# Patient Record
Sex: FEMALE | Race: BLACK OR AFRICAN AMERICAN | Employment: UNEMPLOYED | URBAN - METROPOLITAN AREA
[De-identification: names, ages, dates, MRNs, and addresses within clinical notes are randomized per-mention and may not be internally consistent; named-entity substitution may affect disease eponyms.]

---

## 2018-08-24 ENCOUNTER — HOSPITAL ENCOUNTER (EMERGENCY)
Facility: HOSPITAL | Age: 28
Discharge: HOME | End: 2018-08-24
Attending: EMERGENCY MEDICINE
Payer: MEDICAID

## 2018-08-24 VITALS
DIASTOLIC BLOOD PRESSURE: 59 MMHG | OXYGEN SATURATION: 100 % | RESPIRATION RATE: 16 BRPM | TEMPERATURE: 100 F | SYSTOLIC BLOOD PRESSURE: 115 MMHG | WEIGHT: 145 LBS | HEART RATE: 86 BPM

## 2018-08-24 DIAGNOSIS — S69.91XA INJURY OF FINGER OF RIGHT HAND, INITIAL ENCOUNTER: Primary | ICD-10-CM

## 2018-08-24 DIAGNOSIS — T14.8XXA WOUND INFECTION: ICD-10-CM

## 2018-08-24 DIAGNOSIS — L08.9 WOUND INFECTION: ICD-10-CM

## 2018-08-24 PROCEDURE — 99283 EMERGENCY DEPT VISIT LOW MDM: CPT

## 2018-08-24 PROCEDURE — 29130 APPL FINGER SPLINT STATIC: CPT

## 2018-08-24 RX ORDER — CEPHALEXIN 500 MG/1
500 CAPSULE ORAL 4 TIMES DAILY
Qty: 40 CAPSULE | Refills: 0 | Status: SHIPPED | OUTPATIENT
Start: 2018-08-24 | End: 2018-09-03

## 2018-08-24 NOTE — CM/SW NOTE
Patient has out of [employer] and needs to follow up with hand specialist. Patient has [employer]. This writer went on [facility] and found two hand specialists for patient to follow up with. Patient also needs to get a PCP.  She c

## 2018-08-24 NOTE — ED INITIAL ASSESSMENT (HPI)
Pt here with report of getting sutures in right 4th digit at another facility 6 days ago and ws told to get them out today. Pt states its painful, had drainage yesterday and is \"limp\" and she can't move it.

## 2018-08-24 NOTE — ED PROVIDER NOTES
Patient Seen in: [facility] Emergency Department    History   Patient presents with:  Sut Stap Olga (ingtegumentary)    Stated Complaint: wound check/suture removal    38-year-old female presents today with some history of laceration to the r oriented to person, place, and time. She appears well-developed and well-nourished. No distress. HENT:   Head: Normocephalic. Neck: Normal range of motion.    Pulmonary/Chest: Effort normal.   Musculoskeletal:   Patient with laceration sutures to the pa daily.  Qty: 40 capsule Refills: 0

## 2019-01-16 ENCOUNTER — HOSPITAL ENCOUNTER (EMERGENCY)
Facility: HOSPITAL | Age: 29
Discharge: HOME | End: 2019-01-16
Attending: EMERGENCY MEDICINE
Payer: MEDICAID

## 2019-01-16 ENCOUNTER — HOSPITAL ENCOUNTER (EMERGENCY)
Facility: HOSPITAL | Age: 29
Discharge: LEFT BEFORE DISCHARGE | End: 2019-01-16
Payer: MEDICAID

## 2019-01-16 VITALS
HEART RATE: 86 BPM | DIASTOLIC BLOOD PRESSURE: 56 MMHG | RESPIRATION RATE: 18 BRPM | SYSTOLIC BLOOD PRESSURE: 112 MMHG | WEIGHT: 140 LBS | HEIGHT: 66 IN | OXYGEN SATURATION: 98 % | TEMPERATURE: 99 F | BODY MASS INDEX: 22.5 KG/M2

## 2019-01-16 VITALS
TEMPERATURE: 99 F | BODY MASS INDEX: 27.48 KG/M2 | RESPIRATION RATE: 18 BRPM | HEART RATE: 76 BPM | SYSTOLIC BLOOD PRESSURE: 98 MMHG | HEIGHT: 60 IN | DIASTOLIC BLOOD PRESSURE: 50 MMHG | OXYGEN SATURATION: 100 % | WEIGHT: 140 LBS

## 2019-01-16 DIAGNOSIS — K02.9 PAIN DUE TO DENTAL CARIES: Primary | ICD-10-CM

## 2019-01-16 PROCEDURE — 99283 EMERGENCY DEPT VISIT LOW MDM: CPT

## 2019-01-16 RX ORDER — AMOXICILLIN AND CLAVULANATE POTASSIUM 875; 125 MG/1; MG/1
1 TABLET, FILM COATED ORAL 2 TIMES DAILY
Qty: 20 TABLET | Refills: 0 | Status: SHIPPED | OUTPATIENT
Start: 2019-01-16 | End: 2019-01-26

## 2019-01-16 RX ORDER — AMOXICILLIN 875 MG/1
875 TABLET, COATED ORAL ONCE
Status: COMPLETED | OUTPATIENT
Start: 2019-01-16 | End: 2019-01-16

## 2019-01-16 RX ORDER — HYDROCODONE BITARTRATE AND ACETAMINOPHEN 5; 325 MG/1; MG/1
2 TABLET ORAL ONCE
Status: COMPLETED | OUTPATIENT
Start: 2019-01-16 | End: 2019-01-16

## 2019-01-16 RX ORDER — IBUPROFEN 600 MG/1
600 TABLET ORAL ONCE
Status: COMPLETED | OUTPATIENT
Start: 2019-01-16 | End: 2019-01-16

## 2019-01-16 RX ORDER — HYDROCODONE BITARTRATE AND ACETAMINOPHEN 5; 325 MG/1; MG/1
1-2 TABLET ORAL EVERY 6 HOURS PRN
Qty: 10 TABLET | Refills: 0 | Status: SHIPPED | OUTPATIENT
Start: 2019-01-16 | End: 2019-01-23

## 2019-01-16 NOTE — ED INITIAL ASSESSMENT (HPI)
26 y/o female to ED with c/o of right lower tooth pain. Patient reports she followed up with dentist x1 months ago, reports wisdom teeth need to get removed, has been unable to follow up again.  Pain has worsened with swelling progression to right side of f

## 2019-01-16 NOTE — ED PROVIDER NOTES
Patient Seen in: [facility] Emergency Department    History   Patient presents with:  Dental Problem (dental)    Stated Complaint: Dental pain    HPI    Patient is a 25-year-old been complaining of worsening dental pain for the last several days.   [employer] No focal deficits visualized. ED Course   Labs Reviewed - No data to display       Patient was given amoxicillin, ibuprofen, Norco in the ER.   Recommend follow-up with dentist as soon as possible for definitive management      MDM     Patient is alert,

## 2021-10-15 ENCOUNTER — NURSE ONLY (OUTPATIENT)
Dept: INTERNAL MEDICINE CLINIC | Facility: HOSPITAL | Age: 31
End: 2021-10-15
Attending: EMERGENCY MEDICINE

## 2021-10-15 DIAGNOSIS — Z00.00 WELLNESS EXAMINATION: Primary | ICD-10-CM

## 2021-10-15 PROCEDURE — 86480 TB TEST CELL IMMUN MEASURE: CPT

## 2021-10-19 ENCOUNTER — HOSPITAL ENCOUNTER (OUTPATIENT)
Dept: GENERAL RADIOLOGY | Facility: HOSPITAL | Age: 31
Discharge: HOME | End: 2021-10-19
Attending: PREVENTIVE MEDICINE

## 2021-10-19 ENCOUNTER — EMPLOYEE HEALTH (OUTPATIENT)
Dept: OTHER | Facility: HOSPITAL | Age: 31
End: 2021-10-19
Attending: PREVENTIVE MEDICINE

## 2021-10-19 DIAGNOSIS — Z11.1 SCREENING-PULMONARY TB: Primary | ICD-10-CM

## 2022-10-04 ENCOUNTER — APPOINTMENT (OUTPATIENT)
Dept: OTHER | Facility: HOSPITAL | Age: 32
End: 2022-10-04
Attending: PREVENTIVE MEDICINE

## 2023-06-02 ENCOUNTER — APPOINTMENT (OUTPATIENT)
Dept: ULTRASOUND IMAGING | Facility: HOSPITAL | Age: 33
End: 2023-06-02
Attending: EMERGENCY MEDICINE
Payer: MEDICAID

## 2023-06-02 ENCOUNTER — APPOINTMENT (OUTPATIENT)
Dept: ULTRASOUND IMAGING | Facility: HOSPITAL | Age: 33
End: 2023-06-02
Payer: MEDICAID

## 2023-06-02 ENCOUNTER — HOSPITAL ENCOUNTER (EMERGENCY)
Facility: HOSPITAL | Age: 33
Discharge: HOME | End: 2023-06-02
Attending: EMERGENCY MEDICINE
Payer: MEDICAID

## 2023-06-02 VITALS
BODY MASS INDEX: 28.32 KG/M2 | HEIGHT: 65 IN | SYSTOLIC BLOOD PRESSURE: 117 MMHG | DIASTOLIC BLOOD PRESSURE: 63 MMHG | WEIGHT: 170 LBS | TEMPERATURE: 99 F | HEART RATE: 65 BPM | OXYGEN SATURATION: 100 % | RESPIRATION RATE: 18 BRPM

## 2023-06-02 DIAGNOSIS — O20.0 THREATENED MISCARRIAGE: Primary | ICD-10-CM

## 2023-06-02 LAB
B-HCG SERPL-ACNC: 554 MIU/ML
BASOPHILS # BLD AUTO: 0.05 X10(3) UL (ref 0–0.2)
BASOPHILS NFR BLD AUTO: 0.8 %
EOSINOPHIL # BLD AUTO: 0.14 X10(3) UL (ref 0–0.7)
EOSINOPHIL NFR BLD AUTO: 2.3 %
ERYTHROCYTE [DISTWIDTH] IN BLOOD BY AUTOMATED COUNT: 12.4 %
HCT VFR BLD AUTO: 38.8 %
HGB BLD-MCNC: 13.2 G/DL
IMM GRANULOCYTES # BLD AUTO: 0.02 X10(3) UL (ref 0–1)
IMM GRANULOCYTES NFR BLD: 0.3 %
LYMPHOCYTES # BLD AUTO: 1.56 X10(3) UL (ref 1–4)
LYMPHOCYTES NFR BLD AUTO: 25.4 %
MCH RBC QN AUTO: 28.1 PG (ref 26–34)
MCHC RBC AUTO-ENTMCNC: 34 G/DL (ref 31–37)
MCV RBC AUTO: 82.7 FL
MONOCYTES # BLD AUTO: 0.52 X10(3) UL (ref 0.1–1)
MONOCYTES NFR BLD AUTO: 8.5 %
NEUTROPHILS # BLD AUTO: 3.84 X10 (3) UL (ref 1.5–7.7)
NEUTROPHILS # BLD AUTO: 3.84 X10(3) UL (ref 1.5–7.7)
NEUTROPHILS NFR BLD AUTO: 62.7 %
PLATELET # BLD AUTO: 243 10(3)UL (ref 150–450)
RBC # BLD AUTO: 4.69 X10(6)UL
RH BLOOD TYPE: POSITIVE
WBC # BLD AUTO: 6.1 X10(3) UL (ref 4–11)

## 2023-06-02 PROCEDURE — 86901 BLOOD TYPING SEROLOGIC RH(D): CPT | Performed by: EMERGENCY MEDICINE

## 2023-06-02 PROCEDURE — 86900 BLOOD TYPING SEROLOGIC ABO: CPT | Performed by: EMERGENCY MEDICINE

## 2023-06-02 PROCEDURE — 76817 TRANSVAGINAL US OBSTETRIC: CPT | Performed by: EMERGENCY MEDICINE

## 2023-06-02 PROCEDURE — 85025 COMPLETE CBC W/AUTO DIFF WBC: CPT

## 2023-06-02 PROCEDURE — 84702 CHORIONIC GONADOTROPIN TEST: CPT

## 2023-06-02 PROCEDURE — 85025 COMPLETE CBC W/AUTO DIFF WBC: CPT | Performed by: EMERGENCY MEDICINE

## 2023-06-02 PROCEDURE — 99285 EMERGENCY DEPT VISIT HI MDM: CPT

## 2023-06-02 PROCEDURE — 86901 BLOOD TYPING SEROLOGIC RH(D): CPT

## 2023-06-02 PROCEDURE — 96360 HYDRATION IV INFUSION INIT: CPT

## 2023-06-02 PROCEDURE — 84702 CHORIONIC GONADOTROPIN TEST: CPT | Performed by: EMERGENCY MEDICINE

## 2023-06-02 PROCEDURE — 76801 OB US < 14 WKS SINGLE FETUS: CPT | Performed by: EMERGENCY MEDICINE

## 2023-06-02 PROCEDURE — 86900 BLOOD TYPING SEROLOGIC ABO: CPT

## 2023-06-02 PROCEDURE — 96361 HYDRATE IV INFUSION ADD-ON: CPT

## 2023-06-02 RX ORDER — SODIUM CHLORIDE 9 MG/ML
1000 INJECTION, SOLUTION INTRAVENOUS ONCE
Status: COMPLETED | OUTPATIENT
Start: 2023-06-02 | End: 2023-06-02

## 2023-10-16 ENCOUNTER — APPOINTMENT (OUTPATIENT)
Dept: GENERAL RADIOLOGY | Facility: HOSPITAL | Age: 33
End: 2023-10-16
Attending: EMERGENCY MEDICINE
Payer: MEDICAID

## 2023-10-16 ENCOUNTER — HOSPITAL ENCOUNTER (EMERGENCY)
Facility: HOSPITAL | Age: 33
Discharge: HOME | End: 2023-10-16
Attending: EMERGENCY MEDICINE
Payer: MEDICAID

## 2023-10-16 VITALS
BODY MASS INDEX: 27.8 KG/M2 | OXYGEN SATURATION: 100 % | HEIGHT: 66 IN | RESPIRATION RATE: 16 BRPM | TEMPERATURE: 98 F | SYSTOLIC BLOOD PRESSURE: 110 MMHG | HEART RATE: 56 BPM | DIASTOLIC BLOOD PRESSURE: 61 MMHG | WEIGHT: 173 LBS

## 2023-10-16 DIAGNOSIS — M24.412 RECURRENT SHOULDER DISLOCATION, LEFT: Primary | ICD-10-CM

## 2023-10-16 DIAGNOSIS — H72.91 PERFORATION OF RIGHT TYMPANIC MEMBRANE: ICD-10-CM

## 2023-10-16 PROCEDURE — 73030 X-RAY EXAM OF SHOULDER: CPT | Performed by: EMERGENCY MEDICINE

## 2023-10-16 PROCEDURE — 96374 THER/PROPH/DIAG INJ IV PUSH: CPT

## 2023-10-16 PROCEDURE — 99284 EMERGENCY DEPT VISIT MOD MDM: CPT

## 2023-10-16 RX ORDER — DEXAMETHASONE SODIUM PHOSPHATE 10 MG/ML
10 INJECTION, SOLUTION INTRAMUSCULAR; INTRAVENOUS ONCE
Status: COMPLETED | OUTPATIENT
Start: 2023-10-16 | End: 2023-10-16

## 2023-10-16 NOTE — ED INITIAL ASSESSMENT (HPI)
Patient arrived via EMS, called  for left shoulder injury, \"popped out\" while getting out of bed. Fentanyl given per ems.

## 2023-12-29 ENCOUNTER — HOSPITAL ENCOUNTER (EMERGENCY)
Facility: HOSPITAL | Age: 33
Discharge: LEFT BEFORE DISCHARGE | End: 2023-12-29
Payer: MEDICAID

## 2023-12-29 VITALS
WEIGHT: 168 LBS | HEART RATE: 68 BPM | BODY MASS INDEX: 27 KG/M2 | RESPIRATION RATE: 18 BRPM | OXYGEN SATURATION: 98 % | TEMPERATURE: 99 F | SYSTOLIC BLOOD PRESSURE: 118 MMHG | DIASTOLIC BLOOD PRESSURE: 70 MMHG | HEIGHT: 66 IN

## 2023-12-29 RX ORDER — RISPERIDONE 2 MG/1
5 TABLET, ORALLY DISINTEGRATING ORAL 2 TIMES DAILY
COMMUNITY

## 2023-12-29 NOTE — ED INITIAL ASSESSMENT (HPI)
Patient states rash she knows to be scabies began two days ago bilateral arms and spreading since into bilateral shoulders.  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

## 2024-02-18 ENCOUNTER — APPOINTMENT (OUTPATIENT)
Dept: GENERAL RADIOLOGY | Facility: HOSPITAL | Age: 34
End: 2024-02-18
Attending: EMERGENCY MEDICINE
Payer: MEDICAID

## 2024-02-18 ENCOUNTER — HOSPITAL ENCOUNTER (EMERGENCY)
Facility: HOSPITAL | Age: 34
Discharge: HOME | End: 2024-02-18
Attending: EMERGENCY MEDICINE
Payer: MEDICAID

## 2024-02-18 VITALS
RESPIRATION RATE: 18 BRPM | HEART RATE: 62 BPM | DIASTOLIC BLOOD PRESSURE: 72 MMHG | TEMPERATURE: 98 F | OXYGEN SATURATION: 97 % | SYSTOLIC BLOOD PRESSURE: 96 MMHG | WEIGHT: 168 LBS | BODY MASS INDEX: 27.99 KG/M2 | HEIGHT: 65 IN

## 2024-02-18 DIAGNOSIS — S43.005D DISLOCATION OF LEFT SHOULDER JOINT, SUBSEQUENT ENCOUNTER: Primary | ICD-10-CM

## 2024-02-18 PROCEDURE — 23650 CLTX SHO DSLC W/MNPJ WO ANES: CPT

## 2024-02-18 PROCEDURE — 99285 EMERGENCY DEPT VISIT HI MDM: CPT

## 2024-02-18 PROCEDURE — 99152 MOD SED SAME PHYS/QHP 5/>YRS: CPT

## 2024-02-18 PROCEDURE — 96374 THER/PROPH/DIAG INJ IV PUSH: CPT

## 2024-02-18 PROCEDURE — 96375 TX/PRO/DX INJ NEW DRUG ADDON: CPT

## 2024-02-18 PROCEDURE — 73030 X-RAY EXAM OF SHOULDER: CPT | Performed by: EMERGENCY MEDICINE

## 2024-02-18 RX ORDER — MORPHINE SULFATE 4 MG/ML
4 INJECTION, SOLUTION INTRAMUSCULAR; INTRAVENOUS ONCE
Status: COMPLETED | OUTPATIENT
Start: 2024-02-18 | End: 2024-02-18

## 2024-02-18 RX ORDER — ONDANSETRON 2 MG/ML
4 INJECTION INTRAMUSCULAR; INTRAVENOUS ONCE
Status: COMPLETED | OUTPATIENT
Start: 2024-02-18 | End: 2024-02-18

## 2024-02-19 NOTE — ED INITIAL ASSESSMENT (HPI)
Left shoulder dislocation  at 8:30 pm . Received some fentanyl  from medic. Patient  had  same problem 2 months ago .

## 2024-02-19 NOTE — ED QUICK NOTES
Patient is  crying with pain inj.morphine  4 mg I/v given . Capillary refill normal good radial pulse . Warm to touch  finger tips pink in color

## 2024-02-19 NOTE — ED QUICK NOTES
Patient is verbally abusive to kacie cevallos  and staff.  Patient called   \"[facility]\" Called  public safety .  Security  came and talk to the patient patient is for discharge I/v cannula  removed.

## 2024-02-19 NOTE — ED PROVIDER NOTES
Patient Seen in: [facility] Emergency Department      History     Chief Complaint   Patient presents with    Arm or Hand Injury     Stated Complaint:     Subjective:   HPI    Patient is a 33-year-old female presents emergency room for evaluation of left shoulder dislocation.  She was brought in by EMS.  She complains of left shoulder pain after tapping a closet door tonight.  She has history of dislocation x 2 in the past.  She denies other complaints.    Objective:   History reviewed. No pertinent past medical history.           History reviewed. No pertinent surgical history.             Social History     Socioeconomic History    Marital status: Single   Tobacco Use    Smoking status: Former     Types: Cigarettes    Smokeless tobacco: Never   Vaping Use    Vaping Use: Never used   Substance and Sexual Activity    Alcohol use: Not Currently    Drug use: Never              Review of Systems    Positive for stated complaint:   Other systems are as noted in HPI.  Constitutional and vital signs reviewed.      All other systems reviewed and negative except as noted above.    Physical Exam     ED Triage Vitals [02/18/24 2114]   BP (!) 116/95   Pulse 78   Resp 18   Temp 97.8 °F (36.6 °C)   Temp src Temporal   SpO2 100 %   O2 Device None (Room air)       Current:BP 96/72   Pulse 62   Temp 97.8 °F (36.6 °C) (Temporal)   Resp 18   Ht 165.1 cm (5' 5\")   Wt 76.2 kg   LMP 02/16/2024 (Exact Date)   SpO2 97%   BMI 27.96 kg/m²         Physical Exam    Constitutional: Well-appearing in no acute distress  HEENT: Oropharynx unremarkable  Lungs: Clear  Cardiovascular: Regular rate and rhythm, normal sinus 2  Musculoskeletal: Patient has obvious step-off deformity left shoulder.  Left clavicle and AC joint are nontender.  She has intact sensation left lateral humerus.  Good radial and ulnar pulses to the left upper extremity    ED Course   Labs Reviewed - No data to display          I personally reviewed xray films of  left shoulder and independent interpretation shows good reduction left shoulder with normal alignment and no fracture.  I also reviewed formal xray report as read by radiology with findings below:    XR SHOULDER, COMPLETE (MIN 2 VIEWS), LEFT (CPT=73030)    Result Date: 2/18/2024  PROCEDURE:  XR SHOULDER, COMPLETE (MIN 2 VIEWS), LEFT (CPT=73030)  TECHNIQUE:  Multiple views were obtained.  COMPARISON:  [facility] , XR, XR SHOULDER, COMPLETE (MIN 2 VIEWS), LEFT (CPT=73030), 10/16/2023, 7:16 AM.  INDICATIONS:  shoulder dislocation s/p reduction  PATIENT STATED HISTORY: (As transcribed by Technologist)  post reduction    FINDINGS:  No acute fracture or dislocation.  Joint spaces and bony alignment are maintained.  No focal soft tissue abnormality.            CONCLUSION:  No acute fracture is evident.  Anatomic alignment of the glenohumeral joint.   LOCATION:  [facility]   Dictated by (CST): Kwasi Landin MD on 2/18/2024 at 10:35 PM     Finalized by (CST): Kwasi Landin MD on 2/18/2024 at 10:36 PM         The patient required sedation for left shoulder dislocation.  The risks, benefits, and alternatives were discussed with the patient and/or the family who consented.  The patient had a screening history and exam completed and there are no contraindications to sedation.   ASA designation is ASA 1 - Normal health patient.  Mallampati score: II (hard and soft palate, upper portion of tonsils anduvula visible).  The patient was placed on monitors and was under constant nursing observation.  Under my direct supervision the patient was given propofol.  An appropriate level of sedation was achieved.  The patient remained hemodynamically stable with normal oxygen saturations during the procedure.  There were no complications related to the sedation.  Patient was observed until mental status returned to baseline.  Patient was subsequently deemed appropriate for discharge home with responsible caretaker.  The sedation lasted 16 minutes  during which I was present.      Procedure note: Left shoulder reduction.  Patient was given sedation for left shoulder reduction.  Patient had external rotation and adduction performed of the left shoulder with good reduction.  Step-off was resolved.  X-ray obtained showing good reduction.  She was placed in shoulder immobilizer      MDM      Patient is a 33-year-old female presents emergency room for evaluation of left shoulder pain.  Differential clues fracture, shoulder dislocation.  Patient with clinical evidence for left shoulder dislocation.  Status post sedation and reduction with good reduction left shoulder.  Shoulder immobilizer given.  Patient was extremely rude to ED staff including the emergency room techs, nurses.  She has to speak with me about her discharge instructions.  I came into the room and answered all of her questions.  As I was walking out of the room, she called me and asshole.  I questioned her on why she was calling me and asshole.  She became very agitated, yelling and screaming obscenities.  I explained to her that I will place in her chart a note regarding her behavior towards myself and staff.  She had to be escorted out by security.  She was given orthopedic follow-up                            Medical Decision Making      Disposition and Plan     Clinical Impression:  1. Dislocation of left shoulder joint, subsequent encounter         Disposition:  Discharge  2/18/2024 10:49 pm    Follow-up:  Lombardi, John A, MD  [facility] [address] [address]  [phone]    Follow up  As needed          Medications Prescribed:  Discharge Medication List as of 2/18/2024 11:14 PM

## 2024-02-20 ENCOUNTER — HOSPITAL ENCOUNTER (EMERGENCY)
Facility: HOSPITAL | Age: 34
Discharge: HOME | End: 2024-02-20
Attending: EMERGENCY MEDICINE
Payer: MEDICAID

## 2024-02-20 VITALS
DIASTOLIC BLOOD PRESSURE: 79 MMHG | WEIGHT: 168 LBS | SYSTOLIC BLOOD PRESSURE: 143 MMHG | BODY MASS INDEX: 27.99 KG/M2 | HEART RATE: 61 BPM | TEMPERATURE: 98 F | OXYGEN SATURATION: 99 % | RESPIRATION RATE: 16 BRPM | HEIGHT: 65 IN

## 2024-02-20 DIAGNOSIS — S46.912S SHOULDER STRAIN, LEFT, SEQUELA: Primary | ICD-10-CM

## 2024-02-20 LAB
BILIRUB UR QL STRIP.AUTO: NEGATIVE
CLARITY UR REFRACT.AUTO: CLEAR
GLUCOSE UR STRIP.AUTO-MCNC: NORMAL MG/DL
KETONES UR STRIP.AUTO-MCNC: NEGATIVE MG/DL
LEUKOCYTE ESTERASE UR QL STRIP.AUTO: NEGATIVE
NITRITE UR QL STRIP.AUTO: NEGATIVE
PH UR STRIP.AUTO: 6 [PH] (ref 5–8)
PROT UR STRIP.AUTO-MCNC: NEGATIVE MG/DL
RBC UR QL AUTO: NEGATIVE
SP GR UR STRIP.AUTO: 1.02 (ref 1–1.03)
UROBILINOGEN UR STRIP.AUTO-MCNC: 2 MG/DL

## 2024-02-20 PROCEDURE — 81003 URINALYSIS AUTO W/O SCOPE: CPT

## 2024-02-20 PROCEDURE — 99284 EMERGENCY DEPT VISIT MOD MDM: CPT

## 2024-02-20 PROCEDURE — 81003 URINALYSIS AUTO W/O SCOPE: CPT | Performed by: EMERGENCY MEDICINE

## 2024-02-20 PROCEDURE — 99283 EMERGENCY DEPT VISIT LOW MDM: CPT

## 2024-02-20 PROCEDURE — 96372 THER/PROPH/DIAG INJ SC/IM: CPT

## 2024-02-20 RX ORDER — NAPROXEN 500 MG/1
500 TABLET ORAL 2 TIMES DAILY PRN
Qty: 20 TABLET | Refills: 0 | Status: SHIPPED | OUTPATIENT
Start: 2024-02-20 | End: 2024-02-27

## 2024-02-20 RX ORDER — TIZANIDINE HYDROCHLORIDE 2 MG/1
2 CAPSULE, GELATIN COATED ORAL 3 TIMES DAILY
Qty: 30 CAPSULE | Refills: 0 | Status: SHIPPED | OUTPATIENT
Start: 2024-02-20

## 2024-02-20 RX ORDER — KETOROLAC TROMETHAMINE 30 MG/ML
30 INJECTION, SOLUTION INTRAMUSCULAR; INTRAVENOUS ONCE
Status: COMPLETED | OUTPATIENT
Start: 2024-02-20 | End: 2024-02-20

## 2024-02-21 NOTE — ED INITIAL ASSESSMENT (HPI)
PT SEEN HERE YESTERDAY FOR SHOULDER DISLOCATION, REPAIRED WITH SEDATION. NOW FEELS DEHYDRATED AND STATES URINE IS CONCENTRATED. STILL WITH C/O BACK PAIN.

## 2024-02-23 NOTE — ED PROVIDER NOTES
Patient Seen in: [facility] Emergency Department      History     Chief Complaint   Patient presents with    Back Pain    Dehydration     Stated Complaint: seen here yesterday for shoulder dislocation, feels dehydrated and wants IVF's    Subjective:   HPI    Patient for evaluation of pain in her posterior left shoulder.  She was seen here yesterday by my partner, had a dislocated shoulder was relocated.  She states that she does not do well when she is in intense pain like that and was unable to properly receive discharge instructions.  While she states that she was not sent home with any medications and also feels like she has \"[place]mouth and would like some IV fluids.  Denies any nausea vomiting diarrhea.  No chest pain shortness of breath lightheadedness.  Pain is in her left scapular region, feels like a spasm with positional changes and movement.  Is presently in a shoulder immobilizer.    I did see orthopedic surgery as an outpatient in follow-up earlier today, was told to seek shoulder specialty follow-up with an in network surgeon.      Pain is not exertional.  Not associate with any other pain, shortness of breath lightheadedness or diaphoresis.    Objective:   History reviewed. No pertinent past medical history.           History reviewed. No pertinent surgical history.             Social History     Socioeconomic History    Marital status: Single   Tobacco Use    Smoking status: Former     Types: Cigarettes    Smokeless tobacco: Never   Vaping Use    Vaping Use: Never used   Substance and Sexual Activity    Alcohol use: Not Currently    Drug use: Never              Review of Systems    Positive for stated complaint: seen here yesterday for shoulder dislocation, feels dehydrated and wants IVF's  Other systems are as noted in HPI.  Constitutional and vital signs reviewed.      All other systems reviewed and negative except as noted above.    Physical Exam     ED Triage Vitals [02/20/24 2056]   BP  143/79   Pulse 61   Resp 16   Temp 98.4 °F (36.9 °C)   Temp src Oral   SpO2 99 %   O2 Device        Current:/79   Pulse 61   Temp 98.4 °F (36.9 °C) (Oral)   Resp 16   Ht 165.1 cm (5' 5\")   Wt 76.2 kg   LMP 02/16/2024 (Exact Date)   SpO2 99%   BMI 27.96 kg/m²         Physical Exam    Physical Exam   Constitutional: Awake, alert, well appearing  Head: Normocephalic and atraumatic.   Eyes: Conjunctivae are normal. Pupils are equal, round, and reactive to light.   Neck: Normal range of motion. Neck supple.   Cardiovascular: Normal rate, regular rhythm  Pulmonary/Chest: Normal effort.  No accessory muscle use.  No clubbing, no cyanosis.  Abdominal: Soft. Bowel sounds are normal.   Neurological: Pt is alert and oriented to person, place, and time. no cranial nerve deficits  Skin: Skin is warm and dry.      Left shoulder and shoulder immobilizer.  No deformity.  Sensation intact over the deltoid and as best I can tell she is able to fire the deltoid.  Has obvious reproducible tenderness in her left infrascapular region that is reproduced with certain movements as well.  Specifically rotational and tilting movements of her torso.    ED Course     Labs Reviewed   URINALYSIS, ROUTINE - Abnormal; Notable for the following components:       Result Value    Urobilinogen Urine 2 (*)     All other components within normal limits             Medications   ketorolac (Toradol) 30 MG/ML injection 30 mg (30 mg Intramuscular Given 2/20/24 2147)         Urine specific gravity is fine, no ketones         MDM          Differential diagnoses considered: Fracture, strain, muscle spasm.  -Favoring either a shoulder muscle related strain or potentially a spasm as a result of yesterday's trauma and being in the shoulder immobilizer.  Advil, Tylenol tizanidine.    -Patient is not clinically dehydrated.  Encourage oral rehydration at home      *Prescription sent to the pharmacy as noted below    Of note, the patient already has  follow-up with orthopedic surgeon later this week.          *Discussion of ongoing management of this patient's care included: n/a  *Comorbidities contributing to the complexity of decision making: n/a  *External charts reviewed: [facility] outpatient records reviewed, Dr. Downs's note reviewed.  Asked patient to seek shoulder follow-up given recurrent dislocation.  *Additional sources of history: n/a    Shared decision making was done by: patient, myself.                                     Medical Decision Making      Disposition and Plan     Clinical Impression:  1. Shoulder strain, left, sequela         Disposition:  Discharge  2/20/2024  9:49 pm    Follow-up:  Jimy Graham MD  [address]  [facility] [address]  [phone]    Follow up            Medications Prescribed:  Discharge Medication List as of 2/20/2024  9:55 PM        START taking these medications    Details   naproxen 500 MG Oral Tab Take 1 tablet (500 mg total) by mouth 2 (two) times daily as needed., Normal, Disp-20 tablet, R-0      tiZANidine HCl 2 MG Oral Cap Take 1 capsule (2 mg total) by mouth 3 (three) times daily., Normal, Disp-30 capsule, R-0

## 2024-08-02 ENCOUNTER — APPOINTMENT (OUTPATIENT)
Dept: GENERAL RADIOLOGY | Facility: HOSPITAL | Age: 34
End: 2024-08-02
Attending: EMERGENCY MEDICINE
Payer: MEDICAID

## 2024-08-02 ENCOUNTER — HOSPITAL ENCOUNTER (EMERGENCY)
Facility: HOSPITAL | Age: 34
Discharge: HOME | End: 2024-08-03
Attending: EMERGENCY MEDICINE
Payer: MEDICAID

## 2024-08-02 DIAGNOSIS — S43.005A DISLOCATION OF LEFT SHOULDER JOINT, INITIAL ENCOUNTER: Primary | ICD-10-CM

## 2024-08-02 PROCEDURE — 96374 THER/PROPH/DIAG INJ IV PUSH: CPT

## 2024-08-02 PROCEDURE — 23650 CLTX SHO DSLC W/MNPJ WO ANES: CPT

## 2024-08-02 PROCEDURE — 99285 EMERGENCY DEPT VISIT HI MDM: CPT

## 2024-08-02 PROCEDURE — 73030 X-RAY EXAM OF SHOULDER: CPT | Performed by: EMERGENCY MEDICINE

## 2024-08-02 PROCEDURE — 96375 TX/PRO/DX INJ NEW DRUG ADDON: CPT

## 2024-08-02 PROCEDURE — 73020 X-RAY EXAM OF SHOULDER: CPT | Performed by: EMERGENCY MEDICINE

## 2024-08-02 RX ORDER — MORPHINE SULFATE 4 MG/ML
4 INJECTION, SOLUTION INTRAMUSCULAR; INTRAVENOUS ONCE
Status: COMPLETED | OUTPATIENT
Start: 2024-08-02 | End: 2024-08-02

## 2024-08-03 VITALS
HEIGHT: 65 IN | DIASTOLIC BLOOD PRESSURE: 75 MMHG | BODY MASS INDEX: 27.16 KG/M2 | OXYGEN SATURATION: 100 % | RESPIRATION RATE: 16 BRPM | TEMPERATURE: 99 F | WEIGHT: 163 LBS | HEART RATE: 66 BPM | SYSTOLIC BLOOD PRESSURE: 116 MMHG

## 2024-08-03 NOTE — ED PROVIDER NOTES
Patient Seen in: [facility] Emergency Department      History     Chief Complaint   Patient presents with    Arm or Hand Injury     Stated Complaint: left shoulder dislocation    Subjective:   HPI    34-year-old female patient presented with a chief complaint of shoulder pain. She has a history of similar issues and is scheduled for surgery soon.  Patient states she does want to try to put on her bra and her left shoulder dislocated 30 minutes prior to arrival.  She has a history of multiple shoulder dislocations.  She called the ambulance and was given 75 mcg of fentanyl and route.  She denies any numbness but rates the pain a 10 out of 10 and she is tearful.  She states she is having trouble getting orthopedics to do surgery but it does sound like it is scheduled.    Objective:   No pertinent past medical history.            No pertinent past surgical history.              No pertinent social history.            Review of Systems    Positive for stated Chief Complaint: Arm or Hand Injury    Other systems are as noted in HPI.  Constitutional and vital signs reviewed.      All other systems reviewed and negative except as noted above.    Physical Exam     ED Triage Vitals [08/02/24 2147]   /76   Pulse 86   Resp 24   Temp 98.6 °F (37 °C)   Temp src Oral   SpO2 100 %   O2 Device None (Room air)       Current Vitals:   Vital Signs  BP: 104/67  Pulse: 63  Resp: 16  Temp: 98.6 °F (37 °C)  Temp src: Oral  MAP (mmHg): 79    Oxygen Therapy  SpO2: 98 %  O2 Device: None (Room air)  ETCO2 (mmHg): 34 mmHg            Physical Exam      Vital signs reviewed  General appearance: Patient is alert and in moderate pain distress  HEENT: Pupils equal react to light extraocular muscles intact no scleral icterus, mucous membranes are moist, there is no erythema or exudate in the posterior pharynx  Neck: Supple no JVD no lymphadenopathy no meningismus no carotid bruit  CV: Regular rate and rhythm no murmur rub  Respiratory:  Clear to auscultation bilaterally no crackles no wheezes no accessory muscle use  Abdomen: Soft nontender nondistended, no rebound no guarding  no hepatosplenomegaly bowel sounds are present , no pulsatile mass  Extremities: No clubbing cyanosis or edema 2+ distal pulses.  Left shoulder does have a step-off deformity.  Does appear dislocated  Neuro: Cranial nerves II through XII intact with no gross focal sensory or motor abnormality.      ED Course   Labs Reviewed - No data to display          Patient was evaluated in the emergency department and does appear to have a dislocated shoulder.  She is frustrated having to wait so long to get this fixed from orthopedics.  She is very pleasant however here.  I did offer her a nonsedative technique to do the shoulder relocation but she states she is in so much pain she really would just prefer to being put to sleep to get it done.  She states she has not eaten anything since 1 PM.  I am in agreement and happy to do that for her.    The patient required sedation for shoulder relocation.  The risks, benefits, and alternatives were discussed with the patient and/or the family who consented.  The patient had a screening history and exam completed and there are no contraindications to sedation.  The patient has been NPO for 8 hours. ASA designation is ASA 1 - Normal health patient.  Mallampati score: II (hard and soft palate, upper portion of tonsils anduvula visible).  The patient was placed on monitors and was under constant nursing observation.  Under my direct supervision the patient was given 120 milligrams of propofol.  An appropriate level of sedation was achieved.  The patient remained hemodynamically stable with normal oxygen saturations during the procedure.  There were no complications related to the sedation.  Patient was observed until mental status returned to baseline.  Patient was subsequently deemed appropriate for discharge home with responsible caretaker.   The sedation lasted 5 minutes during which I was present.    XR SHOULDER, (1 VIEW), LEFT (CPT=73020)    Result Date: 8/2/2024  CONCLUSION:  Successful reduction of previously noted left shoulder dislocation.  No evidence for acute fracture.   LOCATION:  [facility]   Dictated by (CST): Nevaeh Trinidad MD on 8/02/2024 at 11:59 PM     Finalized by (CST): Nevaeh Trinidad MD on 8/02/2024 at 11:59 PM       XR SHOULDER, COMPLETE (MIN 2 VIEWS), LEFT (CPT=73030)    Result Date: 8/2/2024  CONCLUSION:  There is anterior inferior dislocation of the left humeral head in relation to the glenoid.   LOCATION:  [facility]   Dictated by (CST): Nevaeh Trinidad MD on 8/02/2024 at 11:13 PM     Finalized by (CST): Nevaeh Trinidad MD on 8/02/2024 at 11:13 PM          Good reduction of the shoulder was seen on postreduction films.  Patient was wide-awake saturating well will be discharged home she should follow-up with her orthopedist.    Patient was seen immediately upon arrival due to a high probability of sudden, clinically significant, or life threatening deterioration of the patient's clinical status.  The patient required my time at the bedside performing direct personal management, the absence of which would likely result in sudden, clinically significant or life threatening deterioration of  clinical condition.   The patient required my constant attention. Time was spent ordering, reviewing, and interpreting ancillary testing and imaging. The patient was frequently reevaluated, and I made frequent checks of  vital signs and monitor. Nursing notes were reviewed.     Critical care time was[60 minutes], and exclusive of procedures.       MDM      Differential diagnosis reflecting the complexity of care include: Shoulder dislocation      External chart review was done and was noted: Patient's note from a few months ago in the ER was reviewed with a shoulder dislocation was reduced successfully here also.      My independent interpretation of  studies of: Initial x-ray showed an anterior shoulder dislocation post films showed good reduction with no acute fracture      Shared decision making was done by myself and patient.  Patient be discharged home follow-up with orthopedics.  She does have an appointment with 1 in [facility] but did tell her about another group she could try if having trouble getting in to get this fixed.        Patient was screened and evaluated during this visit.  As the treating physician attending to the patient, I determined within reasonable clinical confidence and prior to discharge, that an emergency medical condition was not or was no longer present.  There was no indication for further evaluation, treatment, or admission on an emergency basis.  Comprehensive verbal and written discharge and follow-up instructions were provided to help prevent relapse or worsening.  Patient was instructed to follow-up with primary care provider for further evaluation treatment, return immediately to ER for worsening, concerning, new, or changing/persisting symptoms.  I discussed the case with the patient and they had no questions, complaints, or concerns.  Patient was comfortable going home.      Dictation Disclaimer Note:   To increase efficiency this document may have been prepared using voice recognition technology. Every effort has been made to correct any errors made during preparation of this note. However, if a word or phrase is confusing, or does not make sense, this is likely due to a recognition error within the program which was not discovered during editing. Please do not hesitate to contact to address any significant errors.    Note to Patient:   The 21st Century Cures Act makes medical notes like these available to patients in the interest of transparency. Please be advised this is a medical document. Medical documents are intended to carry relevant information, facts as evident, and the clinical opinion of the practitioner. The  medical note is intended as peer to peer communication and may appear blunt or direct. It is written in medical language and may contain abbreviations or verbiage that are unfamiliar.                               Medical Decision Making      Disposition and Plan     Clinical Impression:  1. Dislocation of left shoulder joint, initial encounter         Disposition:  Discharge  8/3/2024 12:04 am    Follow-up:  your orthopedics    Follow up            Medications Prescribed:  Current Discharge Medication List

## 2024-08-03 NOTE — ED INITIAL ASSESSMENT (HPI)
Pt here by EMS after shoulder dislocated while putting bra on about 30 min PTA, pt has hx of multiple shoulder dislocations. 75 mcg fentanyl given in route.

## 2024-10-23 ENCOUNTER — HOSPITAL ENCOUNTER (EMERGENCY)
Facility: HOSPITAL | Age: 34
Discharge: LEFT BEFORE DISCHARGE | End: 2024-10-24
Payer: MEDICAID

## 2024-10-23 VITALS
SYSTOLIC BLOOD PRESSURE: 110 MMHG | TEMPERATURE: 98 F | BODY MASS INDEX: 27.82 KG/M2 | HEIGHT: 65 IN | RESPIRATION RATE: 16 BRPM | OXYGEN SATURATION: 99 % | HEART RATE: 70 BPM | WEIGHT: 167 LBS | DIASTOLIC BLOOD PRESSURE: 64 MMHG

## 2024-10-24 NOTE — ED INITIAL ASSESSMENT (HPI)
Pt concerned for swelling on left side of face, under left ear.  Feels face \"filled with air\"  denies trouble breathing/swallowing.  No rash reported.

## 2025-01-23 ENCOUNTER — APPOINTMENT (OUTPATIENT)
Dept: GENERAL RADIOLOGY | Facility: HOSPITAL | Age: 35
End: 2025-01-23
Attending: EMERGENCY MEDICINE
Payer: MEDICAID

## 2025-01-23 ENCOUNTER — HOSPITAL ENCOUNTER (EMERGENCY)
Facility: HOSPITAL | Age: 35
Discharge: HOME | End: 2025-01-23
Attending: EMERGENCY MEDICINE
Payer: MEDICAID

## 2025-01-23 VITALS
HEART RATE: 55 BPM | TEMPERATURE: 98 F | DIASTOLIC BLOOD PRESSURE: 53 MMHG | OXYGEN SATURATION: 100 % | BODY MASS INDEX: 24.91 KG/M2 | RESPIRATION RATE: 18 BRPM | HEIGHT: 66 IN | WEIGHT: 155 LBS | SYSTOLIC BLOOD PRESSURE: 104 MMHG

## 2025-01-23 DIAGNOSIS — S43.005A DISLOCATION OF LEFT SHOULDER JOINT, INITIAL ENCOUNTER: Primary | ICD-10-CM

## 2025-01-23 PROCEDURE — 96376 TX/PRO/DX INJ SAME DRUG ADON: CPT

## 2025-01-23 PROCEDURE — 99285 EMERGENCY DEPT VISIT HI MDM: CPT

## 2025-01-23 PROCEDURE — 73030 X-RAY EXAM OF SHOULDER: CPT | Performed by: EMERGENCY MEDICINE

## 2025-01-23 PROCEDURE — 23650 CLTX SHO DSLC W/MNPJ WO ANES: CPT

## 2025-01-23 PROCEDURE — 96361 HYDRATE IV INFUSION ADD-ON: CPT

## 2025-01-23 PROCEDURE — 96375 TX/PRO/DX INJ NEW DRUG ADDON: CPT

## 2025-01-23 PROCEDURE — 96374 THER/PROPH/DIAG INJ IV PUSH: CPT

## 2025-01-23 RX ORDER — ONDANSETRON 2 MG/ML
4 INJECTION INTRAMUSCULAR; INTRAVENOUS ONCE
Status: COMPLETED | OUTPATIENT
Start: 2025-01-23 | End: 2025-01-23

## 2025-01-23 RX ORDER — SODIUM CHLORIDE 9 MG/ML
125 INJECTION, SOLUTION INTRAVENOUS CONTINUOUS
Status: DISCONTINUED | OUTPATIENT
Start: 2025-01-23 | End: 2025-01-23

## 2025-01-23 RX ORDER — HYDROMORPHONE HYDROCHLORIDE 1 MG/ML
1 INJECTION, SOLUTION INTRAMUSCULAR; INTRAVENOUS; SUBCUTANEOUS ONCE
Status: COMPLETED | OUTPATIENT
Start: 2025-01-23 | End: 2025-01-23

## 2025-01-23 NOTE — ED PROVIDER NOTES
Patient Seen in: [facility] Emergency Department      History     Chief Complaint   Patient presents with    Arm or Hand Injury     L shoulder dislocation     Stated Complaint: R shoulder dislocation    Subjective:   HPI      This is a 34-year-old female past medical hist anxiety, bipolar who presents with left shoulder dislocation.  Patient states it happened least 5 times previously.  Has been evaluated orthopedics and she states they told her she did not need surgery.  She dislocated her shoulder today attempting to put her shirt on.  She called 911 presents here via ambulance for further evaluation.    Objective:     Past Medical History:    Anxiety    Bipolar 1 disorder (HCC)              History reviewed. No pertinent surgical history.             Social History     Socioeconomic History    Marital status: Single   Tobacco Use    Smoking status: Every Day     Types: Cigarettes, Cigars     Passive exposure: Current    Smokeless tobacco: Never   Vaping Use    Vaping status: Never Used   Substance and Sexual Activity    Alcohol use: Not Currently    Drug use: Not Currently     Types: Cannabis     Social Drivers of Health     Financial Resource Strain: Not on File (10/7/2022)    Received from [facility]    Financial Resource Strain     Financial Resource Strain: 0   Food Insecurity: Not on File (2024)    Received from [facility]    Food Insecurity     Food: 0   Transportation Needs: Not on File (10/7/2022)    Received from [facility]    Transportation Needs     Transportation: 0   Physical Activity: Not on File (10/7/2022)    Received from [facility]    Physical Activity     Physical Activity: 0   Stress: Not on File (10/7/2022)    Received from [facility]    Stress     Stress: 0   Social Connections: Not on File (2024)    Received from [facility]    Social Connections     Connectedness: 0   Housing Stability: Not on File (10/7/2022)    Received from [facility]    Housing Stability     Housin                   Physical Exam     ED Triage Vitals   BP 01/23/25 0618 115/70   Pulse 01/23/25 0618 87   Resp 01/23/25 0618 13   Temp 01/23/25 0929 97.6 °F (36.4 °C)   Temp src 01/23/25 0929 Temporal   SpO2 01/23/25 0618 99 %   O2 Device 01/23/25 0618 None (Room air)       Current Vitals:   Vital Signs  BP: 104/53  Pulse: 55  Resp: 18  Temp: 97.6 °F (36.4 °C)  Temp src: Temporal  MAP (mmHg): 69    Oxygen Therapy  SpO2: 100 %  O2 Device: None (Room air)  O2 Flow Rate (L/min): 3 L/min        Physical Exam  GENERAL: Awake, alert oriented x3, nontoxic appearing.   SKIN: Normal, warm, and dry.  HEENT:  Pupils equally round and reactive to light. Conjuctiva clear.  Oropharynx is clear and moist.   Lungs: Clear to auscultation bilaterally with no rales, no retractions, and no wheezing.  HEART:  Regular rate and rhythm. S1 and S2. No murmurs, no rubs or gallops.   ABDOMEN: Soft, nontender and nondistended. Normoactive bowel sounds. No rebound. No guarding.   EXTREMITIES: Left anterior shoulder dislocation noted on exam.  +2/4 radial pulse.  Sensation intact over the deltoid.  Warm with brisk capillary refill.         ED Course   Labs Reviewed - No data to display         XR SHOULDER, COMPLETE (MIN 2 VIEWS), LEFT (CPT=73030)    Result Date: 1/23/2025  PROCEDURE:  XR SHOULDER, COMPLETE (MIN 2 VIEWS), LEFT (CPT=73030)  TECHNIQUE:  Multiple views were obtained.  COMPARISON:  [facility] , XR, XR SHOULDER, COMPLETE (MIN 2 VIEWS), LEFT (CPT=73030), 1/23/2025, 6:18 AM.  INDICATIONS:  R shoulder dislocation  PATIENT STATED HISTORY: (As transcribed by Technologist)  Left shoulder dislocation -post reduction Patient was combative during the exam. Best possible images.    FINDINGS:  Close reduction with restoration of anatomic relationship between the glenoid and humeral head.  No fracture demonstrated.              CONCLUSION:  As above.   LOCATION:  [facility]   Dictated by (CST): Elpidio Thomas MD on 1/23/2025 at 7:08 AM     Finalized by  (CST): Elpidio Thomas MD on 1/23/2025 at 7:08 AM       XR SHOULDER, COMPLETE (MIN 2 VIEWS), LEFT (CPT=73030)    Result Date: 1/23/2025  PROCEDURE:  XR SHOULDER, COMPLETE (MIN 2 VIEWS), LEFT (CPT=73030)  TECHNIQUE:  Multiple views were obtained.  COMPARISON:  [facility] , XR, XR SHOULDER, (1 VIEW), LEFT (CPT=73020), 8/02/2024, 11:49 PM.  INDICATIONS:  R shoulder dislocation  PATIENT STATED HISTORY: (As transcribed by Technologist)  Patient stated falling and has left shoulder dislocation.    FINDINGS:  Abnormal relationship between the glenoid and the humeral head consistent with dislocation, anterior-inferior.  No associated fracture.              CONCLUSION:  Dislocation.   LOCATION:  [facility]   Dictated by (CST): Elpidio Thomas MD on 1/23/2025 at 6:34 AM     Finalized by (CST): Elpidio Thomas MD on 1/23/2025 at 6:34 AM             MDM        This is a 34-year-old female past medical hist anxiety, bipolar who presents with left shoulder dislocation.  Patient states it happened least 5 times previously.  Has been evaluated orthopedics and she states they told her she did not need surgery.  She dislocated her shoulder today attempting to put her shirt on.  Differential includes shoulder dislocation/fracture.    Patient placed on cardiac monitor, continuous pulse oximetry and IV line was established of normal saline.  Patient was given IV Dilaudid and Zofran.      Consent was obtained for closed reduction/conscious sedation.  Respiratory bedside.  Patient was initially given Dilaudid for pain with Zofran.  She was given propofol for the reduction.  Using the FARES technique I successfully reduced her left shoulder dislocation.  Circulation, motor and sensory was intact post reduction.  +2/4 radial pulse.  Sensation intact over deltoid.    Shoulder immobilizer was applied postreduction.    I independently reviewed the initial left shoulder x-ray which showed anterior shoulder dislocation.  Also reviewed radiology interpretation  agreement    I independently reviewed the postreduction x-ray which shows shoulder in good anatomic alignment.  Also reviewed the radiology interpretation and agreement.        Patient was recovered to her neurologic baseline.  She can follow-up with orthopedics.  She was very unhappy with the [facility] orthopedic physician who told her she did not need surgery.  She is very adamant that she wants to have surgical correction.  She was referred to the on-call orthopedic surgeon .  She can take ibuprofen/Tylenol for pain.  Cold compresses topically.  Return for any problems.  Patient was discharged home in good condition.        The patient required sedation for closed reduction left anterior shoulder dislocation.  The risks, benefits, and alternatives were discussed with the patient and/or the family who consented.  The patient had a screening history and exam completed and there are no contraindications to sedation.  The patient has been NPO for 4 hours. ASA designation is ASA 1 - Normal health patient.  Mallampati score: II (hard and soft palate, upper portion of tonsils anduvula visible).  The patient was placed on monitors and was under constant nursing observation.  Under my direct supervision the patient was given propofol.  An appropriate level of sedation was achieved.  The patient remained hemodynamically stable with normal oxygen saturations during the procedure.  There were no complications related to the sedation.  Patient was observed until mental status returned to baseline.  Patient was subsequently deemed appropriate for discharge home with responsible caretaker.  The sedation lasted 20 minutes during which I was present.    A total of 35 minutes of critical care time (exclusive of billable procedures) was administered to manage the patient's critical imaging findings due to her left anterior shoulder dislocation.  This involved direct patient intervention, complex decision making, and/or  extensive discussions with the patient, family, and clinical staff.                 Disposition and Plan     Clinical Impression:  1. Dislocation of left shoulder joint, initial encounter         Disposition:  Discharge  1/23/2025  9:38 am    Follow-up:  Hank Velazco MD  [address] [place], [place] [address] [id number] [phone]    Follow up in 1 week(s)            Medications Prescribed:  Current Discharge Medication List              Supplementary Documentation:

## 2025-01-23 NOTE — DISCHARGE INSTRUCTIONS
Wear shoulder immobilizer for support, may take off while bathing  Follow-up with orthopedics in the next 1 to 2 weeks call and make an appointment  Cold compresses topically to shoulder for discomfort 20 minutes every 2 hours all awake  Ibuprofen and/or Tylenol for pain  Return for any problems

## 2025-01-23 NOTE — ED QUICK NOTES
Patient's profolol dose as follows    0637 40mg  0638 40mg  0639.20 20mg  0639.50 20mg  0640.50 40mg

## 2025-02-12 ENCOUNTER — TELEPHONE (OUTPATIENT)
Facility: CLINIC | Age: 35
End: 2025-02-12

## 2025-02-12 NOTE — TELEPHONE ENCOUNTER
Dislocated shoulder reduced 1/23/25.   Do you want new imaging for appointment 2/19/25?        XR 1/23/25    FINDINGS:  Close reduction with restoration of anatomic relationship between the glenoid and humeral head.  No fracture demonstrated.

## 2025-02-12 NOTE — TELEPHONE ENCOUNTER
Patient scheduled for left shoulder pain.     Future Appointments   Date Time Provider Department Center   2/19/2025  1:40 PM [facility]ivas [id number]     Please advise if imaging is needed.

## 2025-02-19 ENCOUNTER — OFFICE VISIT (OUTPATIENT)
Dept: ORTHOPEDICS CLINIC | Facility: CLINIC | Age: 35
End: 2025-02-19
Payer: MEDICAID

## 2025-02-19 VITALS — BODY MASS INDEX: 24.91 KG/M2 | WEIGHT: 155 LBS | HEIGHT: 66 IN

## 2025-02-19 DIAGNOSIS — S43.015A ANTERIOR SHOULDER DISLOCATION, LEFT, INITIAL ENCOUNTER: Primary | ICD-10-CM

## 2025-02-19 PROCEDURE — 99204 OFFICE O/P NEW MOD 45 MIN: CPT | Performed by: ORTHOPAEDIC SURGERY

## 2025-02-19 NOTE — PROGRESS NOTES
[facility] New Consult      Chief Complaint   Patient presents with    Shoulder Pain     LEFT SHOULDER DISLOCATION, ONSET:01/23/25  -The patient dislocated her shoulder while putting her shirt on  -Hx of shoulder dislocations     HPI: The patient is a 34 year old female referred for orthopaedic consultation with complaints of chronic left shoulder instability and pain.  She unfortunately sustained an anterior-inferior dislocation on 1/23/2025 while getting dressed.  She was seen at the [facility] ED where a closed reduction was required under sedation.  She reports up to 10 previous dislocations without any history of major trauma to the arm.  She has been seen by an outside specialist at [facility] who obtained an ultrasound of the left shoulder given permanent facial piercings precluding the option for an MRI.  At this point she is frustrated with her recurrent shoulder instability and is considering surgical stabilization.    Past Medical History:    Anxiety    Bipolar 1 disorder (HCC)     History reviewed. No pertinent surgical history.  Current Outpatient Medications   Medication Sig Dispense Refill    tiZANidine HCl 2 MG Oral Cap Take 1 capsule (2 mg total) by mouth 3 (three) times daily. (Patient not taking: Reported on 2/19/2025) 30 capsule 0    risperiDONE 2 MG Oral Tablet Dispersible Take 5 mg by mouth 2 (two) times daily. (Patient not taking: Reported on 2/19/2025)       Allergies[1]  History reviewed. No pertinent family history.  Social History     Occupational History    Not on file   Tobacco Use    Smoking status: Every Day     Types: Cigarettes, Cigars     Passive exposure: Current    Smokeless tobacco: Never   Vaping Use    Vaping status: Never Used   Substance and Sexual Activity    Alcohol use: Not Currently    Drug use: Not Currently     Types: Cannabis    Sexual activity: Not on file       ROS:  Complete ROS reviewed by me and non-contributory to the chief complaint except  as mentioned above.    Physical Exam:    Ht 5' 6\" (1.676 m)   Wt 155 lb (70.3 kg)   LMP 01/01/2025 (Approximate)   BMI 25.02 kg/m²   Constitutional: Well developed, well nourished 34 year old female presenting without her shoulder immobilizer  Psychological: NAD, alert and appropriate  Respiratory: Breathing comfortably on room air with RR of 10-14  Cardiac: Palpable distal pulses with pink warm extremities distally  Left shoulder reveals no visible swelling, discoloration or deformity.  Active range of motion generates pain with apprehension forward elevation to approximately 80 degrees.  Similar findings are noted on abduction to 70.  External rotation is adequately tolerated to 25 degrees.  She is very guarded but seemingly intact on manual muscle testing at the infraspinatus, supraspinatus and subscapularis.  Evocative testing for instability is not performed given her recent dislocation and reduction.  Neurovascular status is intact on sensory, motor and perfusion assessment distally.    Imaging: Multiple views left shoulder personally viewed, demonstrating interval reduction of an anterior inferior dislocation of the left glenohumeral joint on 1/23/2025.    XR SHOULDER, COMPLETE (MIN 2 VIEWS), LEFT (CPT=73030)    Result Date: 1/23/2025  CONCLUSION:  As above.   LOCATION:  [facility]   Dictated by (CST): Elpdiio Thomas MD on 1/23/2025 at 7:08 AM     Finalized by (CST): Elpidio Thomas MD on 1/23/2025 at 7:08 AM       XR SHOULDER, COMPLETE (MIN 2 VIEWS), LEFT (CPT=73030)    Result Date: 1/23/2025  CONCLUSION:  Dislocation.   LOCATION:  [facility]   Dictated by (CST): Elpidio Thomas MD on 1/23/2025 at 6:34 AM     Finalized by (CST): Elpidio Thomas MD on 1/23/2025 at 6:34 AM          Assessment/Diagnoses:  Diagnoses and all orders for this visit:    Anterior shoulder dislocation, left, initial encounter      Plan:  I reviewed imaging and exam findings with the patient.  She presents after acute dislocation of the left shoulder.   Unfortunately, she presents without her shoulder immobilizer stating that it was too tight around the abdomen.  Will therefore fit her with a standard sling as an alternative.  I told her that she should continue to protect the shoulder from any elevation, external rotation or abduction.  A course of physical therapy would be appropriate in hopes of strengthening the periscapular and rotator cuff for dynamic stability.  Over the long-term it seems that she is at elevated risk for recurrent instability given her history of 10 previous dislocations.  If definitive management with surgical stabilization is to be considered, I would recommend we obtain an MRI.  Currently there is a recent ultrasound on record which did not show any internal derangement or rotator cuff tear.  A more definitive imaging study would be an MRI of the left shoulder which is contraindicated given her permanent body piercings.  If she elects to remove these body piercings in the immediate future, we would be happy to provide a prescription for an MRI for precertification.  If there is a substantial delay and she elects to proceed with an MRI at a later date, arthrogram may be helpful.  All questions were answered and she verbalized understanding and appreciation.  Will fit her in a standard sling for temporary use over the next 2 to 4 weeks.  Formal physical therapy would be a reasonable next step if desired.  Follow-up with us if and when she is able to remove the body piercing and becomes a candidate for advanced imaging with an MRI.    Mindy Olivas MD, [facility]  Orthopaedic Surgery   Sports Medicine/Knee and Shoulder  [facility]  t: [phone]  f: [phone]           This document was partially prepared using Dragon Medical voice recognition software.  Although every attempt is made to correct errors during dictation, discrepancies may still exist.               [1] No Known Allergies

## 2025-03-17 ENCOUNTER — HOSPITAL ENCOUNTER (OUTPATIENT)
Dept: MRI IMAGING | Facility: HOSPITAL | Age: 35
Discharge: HOME | End: 2025-03-17
Attending: ORTHOPAEDIC SURGERY
Payer: MEDICAID

## 2025-03-17 DIAGNOSIS — S43.015A ANTERIOR SHOULDER DISLOCATION, LEFT, INITIAL ENCOUNTER: ICD-10-CM

## 2025-03-17 PROCEDURE — 73221 MRI JOINT UPR EXTREM W/O DYE: CPT | Performed by: ORTHOPAEDIC SURGERY

## 2025-03-20 ENCOUNTER — TELEPHONE (OUTPATIENT)
Dept: ORTHOPEDICS CLINIC | Facility: CLINIC | Age: 35
End: 2025-03-20

## 2025-03-20 NOTE — TELEPHONE ENCOUNTER
Patient called asking for mri results. Advised patient to schedule an appointment to review them with Dr Olivas. Patient requesting for dr Olivas to review mri with patient over the phone and let patient know if surgery is needed. Please advise

## 2025-03-25 NOTE — TELEPHONE ENCOUNTER
Per  patient needs to come into the office to review MRI results in office, to re-evaluate patient and go over MRI  Okay to double book for 03/27 at 1pm time slot

## 2025-03-27 ENCOUNTER — OFFICE VISIT (OUTPATIENT)
Dept: ORTHOPEDICS CLINIC | Facility: CLINIC | Age: 35
End: 2025-03-27
Payer: MEDICAID

## 2025-03-27 VITALS — WEIGHT: 155 LBS | BODY MASS INDEX: 24.91 KG/M2 | HEIGHT: 66 IN

## 2025-03-27 DIAGNOSIS — S43.015D ANTERIOR DISLOCATION OF LEFT SHOULDER, SUBSEQUENT ENCOUNTER: Primary | ICD-10-CM

## 2025-03-27 DIAGNOSIS — M24.522 CONTRACTURE OF LEFT ELBOW: ICD-10-CM

## 2025-03-27 PROCEDURE — 99214 OFFICE O/P EST MOD 30 MIN: CPT | Performed by: ORTHOPAEDIC SURGERY

## 2025-03-27 RX ORDER — TRAZODONE HYDROCHLORIDE 50 MG/1
50 TABLET ORAL
COMMUNITY
Start: 2025-03-23

## 2025-03-27 NOTE — PROGRESS NOTES
[facility] Note      Chief Complaint   Patient presents with    Follow - Up     LEFT SHOULDER MRI RESULTS     HPI: The patient is a 34 year old female returning for reassessment of her left shoulder following an anterior-inferior dislocation on 1/23/2025 while getting dressed.  She was seen at the [place] ED where a closed reduction was performed under sedation.  She reports history of multiple previous dislocations without history of trauma.  She has subsequently undergone an MRI which is now available for our review.  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  She continues to use the immobilizer during sleep but not during the day or during work.  She wonders if surgical intervention is required.    Past Medical History:    Anxiety    Bipolar 1 disorder (HCC)     History reviewed. No pertinent surgical history.  Current Outpatient Medications   Medication Sig Dispense Refill    traZODone 50 MG Oral Tab 1 tablet (50 mg total).      tiZANidine HCl 2 MG Oral Cap Take 1 capsule (2 mg total) by mouth 3 (three) times daily. (Patient not taking: Reported on 10/23/2024) 30 capsule 0    risperiDONE 2 MG Oral Tablet Dispersible Take 5 mg by mouth 2 (two) times daily. (Patient not taking: Reported on 3/27/2025)       Allergies[1]  History reviewed. No pertinent family history.  Social History     Occupational History    Not on file   Tobacco Use    Smoking status: Every Day     Types: Cigarettes, Cigars     Passive exposure: Current    Smokeless tobacco: Never   Vaping Use    Vaping status: Never Used   Substance and Sexual Activity    Alcohol use: Not Currently    Drug use: Not Currently     Types: Cannabis    Sexual activity: Not on file       ROS:  Complete ROS reviewed by me and non-contributory to the chief complaint except as mentioned above.    Physical Exam:    Ht 5' 6\" (1.676 m)   Wt 155 lb (70.3 kg)   LMP 01/01/2025 (Approximate)   BMI  25.02 kg/m²   Constitutional: Well developed, well nourished 34 year old female presenting without her shoulder immobilizer  Left shoulder reveals no visible swelling, discoloration or deformity.  Active range of motion generates pain with apprehension forward elevation to approximately 100 degrees.  Similar findings are noted on abduction to 90.  External rotation is adequately tolerated to 45 degrees.  She is full strength on manual muscle testing at the infraspinatus, supraspinatus and subscapularis.  Provocative maneuvers for instability are not performed given her recent dislocation.  Neurovascular status is intact on sensory, motor and perfusion assessment distally.    Imaging:   MRI left shoulder performed 3/17/2025 reveals no fracture, dislocation or severe bone marrow edema.  Trace flattening of the superior lateral humeral head suggests a subtle Hill-Sachs lesion.  No bony Bankart, obvious ligamentous disruption is seen anterior inferiorly.  No hemarthrosis is noted.    Multiple views left shoulder from 1/23/2025 demonstrates interval reduction of an anterior inferior dislocation of the left glenohumeral joint.    MRI SHOULDER, LEFT (CPT=73221)    Result Date: 3/18/2025  CONCLUSION:  1. Patient is a history of multiple anterior inferior dislocations of the left mid humeral head relative to the glenoid.  Subtle flattening of the superior lateral margin of the humeral head suggests a subtle Hill-Sachs lesion.  No acute fractures are noted.  There is certainly no osseous Bankart lesion identified. 2. There is suggestion of a slap lesion of the biceps labral anchor with abnormal signal extending from the anchor into the posterior superior quadrant.  In regards to the patient's multiple recent dislocations, this would suggest an injury to the anterior band of the IGHL, however this injury appears occult to the current MRI study.  Further assessment with MR arthrography should be considered.   LOCATION:  [facility]    Dictated by (CST): Jairon Dyer DO on 3/18/2025 at 10:25 AM     Finalized by (CST): Jairon Dyer DO on 3/18/2025 at 10:39 AM       XR SHOULDER, COMPLETE (MIN 2 VIEWS), LEFT (CPT=73030)    Result Date: 1/23/2025  CONCLUSION:  As above.   LOCATION:  [facility]   Dictated by (CST): Elpidio Thomas MD on 1/23/2025 at 7:08 AM     Finalized by (CST): Elpidio Thomas MD on 1/23/2025 at 7:08 AM       XR SHOULDER, COMPLETE (MIN 2 VIEWS), LEFT (CPT=73030)    Result Date: 1/23/2025  CONCLUSION:  Dislocation.   LOCATION:  [facility]   Dictated by (CST): Elpidio Thomas MD on 1/23/2025 at 6:34 AM     Finalized by (CST): Elpidio Thomas MD on 1/23/2025 at 6:34 AM          Assessment/Diagnoses:  Diagnoses and all orders for this visit:    Anterior dislocation of left shoulder, subsequent encounter    Contracture of left elbow      Plan:  I reviewed imaging and exam findings with the patient.  She presents after acute dislocation of the left shoulder and a follow-up MRI.  Although she once again presents without her shoulder immobilizer, she states that she still wears it at night but not at work or during the day.  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  I strongly urged her to avoid this if at all possible and we provided her with a work restriction note limiting the left upper extremity to 10 pounds.  The MRI findings do not identify a clear surgical lesion such as a bony or ligamentous Bankart.  She relates never having had a previous treatment including physical therapy.  She demonstrates elbow flexion contracture presumably related to immobilization which we should address with physical therapy to reverse her elbow contracture.  I dedicated periscapular and dynamic stabilization program for the left shoulder is also indicated as she has never had treatment in the past.  She may be experiencing instability from a multidirectional etiology and therefore physical therapy would be the most reasonable first-line of  treatment.  She inquired about surgical solutions and I told her frankly that there are no obvious surgical lesions on her MRI although reassessment is advised if she has any recurrent episodes or failed improvement with ongoing conservative care.  Reasonable to recheck her after the conclusion of physical therapy perhaps in 6 to 8 weeks.  Questions were answered and she verbalized understanding.  Follow-up sooner if she has new symptoms or poor tolerance.      Mindy Olivas MD, [facility]  Orthopaedic Surgery   Sports Medicine/Knee and Shoulder  [facility]  t: [phone]  f: [phone]           This document was partially prepared using Dragon Medical voice recognition software.  Although every attempt is made to correct errors during dictation, discrepancies may still exist.               [1] No Known Allergies

## 2025-04-08 ENCOUNTER — OFFICE VISIT (OUTPATIENT)
Facility: LOCATION | Age: 35
End: 2025-04-08
Attending: ORTHOPAEDIC SURGERY
Payer: MEDICAID

## 2025-04-08 DIAGNOSIS — M24.522 CONTRACTURE OF LEFT ELBOW: Primary | ICD-10-CM

## 2025-04-08 DIAGNOSIS — S43.015D ANTERIOR DISLOCATION OF LEFT SHOULDER, SUBSEQUENT ENCOUNTER: ICD-10-CM

## 2025-04-08 PROCEDURE — 97140 MANUAL THERAPY 1/> REGIONS: CPT

## 2025-04-08 PROCEDURE — 97161 PT EVAL LOW COMPLEX 20 MIN: CPT

## 2025-04-08 PROCEDURE — 97110 THERAPEUTIC EXERCISES: CPT

## 2025-04-08 NOTE — PROGRESS NOTES
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 Latest one was back in January of this year as she was attempting to get dressed. Has seen multiple orthopedic doctors. Recommended physical therapy to help strengthen and stabilize the shoulder. She does not have any pain currently but has been very apprehensive in moving the shoulder so there is very little use currently. She works as a CNA but currently off of work. Reports they do not want her to work right now. She currently has a 10lb lifting restriction. She is (L) handed. She will wear sling at night which has caused elbow to be very stiff and lacks ability to extend it.   Pain level: current 0 /10, at best 0 /10, at worst 10 /10  Description of symptoms: achy/dull diffuse pain throughout (L) shoulder   Occupation: CNA   Leisure activities/Hobbies: working out   Prior level of function: Prior to January did not have much pain or restriction  Current limitations: dressing/bathing, reaching, carrying/lifting, pushing/pulling, work duties  Pt goals: decrease pain, improve shoulder ROM & strength, prevent further dislocations  Hand Dominance: left   Past medical history was reviewed with Мария.    Imaging/Tests: see chart   Мария  has a past medical history of Anxiety and Bipolar 1 disorder (HCC).  She  has no past surgical history on file.    ASSESSMENT  Мария presents to physical  therapy evaluation with primary c/o (L) shoulder pain and recurrent dislocations. The results of the objective tests and measures show decreased A/PROM (L) shoulder, decreased flexibility, decreased RC & scapular strength/stabilization, postural dysfunction, mobility restrictions. Functional deficits include but are not limited to dressing/bathing, reaching, carrying/lifting, pushing/pulling, work duties. 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 Pt and PT discussed evaluation findings, pathology, POC and HEP.  Pt voiced understanding and performs HEP correctly without reported pain. Skilled Physical Therapy is medically necessary to address the above impairments and reach functional goals.  OBJECTIVE:    Musculoskeletal  Observation/Posture: rounded and forward shoulders; (L) shoulder guarding noted    Palpation: Moderate tightness & associted tenderness noted throughout (L) shoulder complex (UT, rhomboids, pectorals, infraspinatus, subscapularis, teres major)     Accessory motion: defer    Special Tests: Defer     ROM and Strength   Shoulder   ROM MMT (-/5)    R L R L     Flex 165 degrees 90 degrees (pain & tight) 5/5 4-/5 (pain)     Ext 50 degrees defer 5/5 4/5 (pain)     Abd (C5) 160 degrees 90 degrees (pain & tight) 5/5 4-/5 (pain)     IR Functional IR T7 Functional IR low lumbar spine (pain & tight) 5/5 4/5 (pain)     ER Functional ER T4 Functional ER (unable) 5/5 4/5 (pain)     Low Trap n/a         Mid Trap n/a         SA n/a         ,   Elbow   ROM    R L     Flex (C6) Full Full     Ext (C7) Full -10 degrees     Pronation         Supination           Flexibility:  UE Flexibility R L     Upper Trap min restricted mod restricted     Levator Scap min restricted mod restricted     Pec Major mod restricted mod restricted     Scalenes         Latissimus         Bicep          Neurological:  Sensation: intact      Today's Treatment and Response:   Pt education was provided on exam findings, treatment diagnosis, treatment plan, expectations, and prognosis.  Today's Treatment       4/8/2025   UE Treatment   Therapeutic Exercise Self soft tissue release with Lacrosse ball (posterior shoulder, rhomboids, UT, pectorals)  H/L dowel shoulder press x 15  H/L dowel shoulder flexion x 10  PROM (L) shoulder within tolerance    Manual Therapy STM/MFR (L) pectorals, rhomboids    Therapeutic Exercise Minutes 10   Manual Therapy Minutes 10   Evaluation Minutes 20   Total Time Of Timed Procedures 20   Total Time Of Service-Based Procedures 20   Total Treatment Time 40            Patient was instructed in and issued a HEP for: Access Code: [email or web address]  URL: [email or web address]  Date: 04/08/2025  Prepared by: Bhavesh Crabtree    Exercises  - Supine Shoulder Press with Dowel  - 1-2 x daily - 7 x weekly - 2 sets - 15 reps  - Supine Shoulder Flexion Extension AAROM with Dowel  - 1-2 x daily - 7 x weekly - 2 sets - 10 reps - 5 second hold  - Elbow Extension PROM  - 1-2 x daily - 7 x weekly - 2 sets - 10 reps - 5 seconds hold    Charges:  PT EVAL: Low Complexity, TherEx 1, Manual 1  In agreement with evaluation findings and clinical rationale, this evaluation involved LOW COMPLEXITY decision making due to no personal factors/comorbidities, 1-2 body structures involved/activity limitations, and stable symptoms as documented in the evaluation.                                                          PLAN OF CARE:    Goals: (to be met in 12 visits)   Pt will report 50% reduction in symptoms rated at worst   Pt will demonstrate AROM (L) shoulder flexion to 155 degrees or greater with less pain and/or restriction in order to perform reaching activities  Pt will demonstrate AROM (L) shoulder abduction to 155 degrees or greater with less pain and/or restriction in order to perform reaching  activities   Pt will demonstrate at least 4/5 (L) RC strength in order to carrying/lift items to different level surfaces   Pt will demonstrate at least 4/5 scapular strength in order to improve scapulohumeral rhythm and allow patient to reach to higher level surfaces   Pt will report having an easier time donning/doffing clothing with less pain and/or restriction   Pt will perform HEP independently in order to sustain changes made with therapy sessions      Frequency / Duration: Patient will be seen 2x/week or a total of 12  visits over a 90 day period. Treatment will include: Manual Therapy; Neuromuscular Re-education; Therapeutic Activities; Therapeutic Exercise; Home Exercise Program instruction    Education or treatment limitation: None   Rehab Potential: good     QuickDASH Outcome Score  Score: (Patient-Rptd) 34.09 % (4/8/2025  8:54 AM)      Patient/Family/Caregiver was advised of these findings, precautions, and treatment options and has agreed to actively participate in planning and for this course of care.    Thank you for your referral. Please co-sign or sign and return this letter via fax as soon as possible to [phone]. If you have any questions, please contact me at Dept: [phone]    Sincerely,  Electronically signed by therapist: Bhavesh Crabtree PT  Physician's certification required: Yes  I certify the need for these services furnished under this plan of treatment and while under my care.    X___________________________________________________ Date____________________    Certification From: 4/8/2025  To:7/7/2025

## 2025-04-11 ENCOUNTER — OFFICE VISIT (OUTPATIENT)
Facility: LOCATION | Age: 35
End: 2025-04-11
Attending: ORTHOPAEDIC SURGERY
Payer: MEDICAID

## 2025-04-11 PROCEDURE — 97110 THERAPEUTIC EXERCISES: CPT

## 2025-04-11 PROCEDURE — 97140 MANUAL THERAPY 1/> REGIONS: CPT

## 2025-04-11 NOTE — PROGRESS NOTES
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 Arm can go back further       Pain: 1/10     Objective  AAROM flexion to 135 degrees       Assessment  Pt toleated treatment session well. Improved PROM (R) shoulder as well as elbow extension post treatment. No pain throughout session however felt tight and restricted. Discussed performing HEP including elbow extension stretch demonstrated    Goals (to be met in 2 visits)   Pt will report 50% reduction in symptoms rated at worst   Pt will demonstrate AROM (L) shoulder flexion to 155 degrees or greater with less pain and/or restriction in order to perform reaching activities  Pt will demonstrate AROM (L) shoulder abduction to 155 degrees or greater with less pain and/or restriction in order to perform reaching activities   Pt will demonstrate at least 4/5 (L) RC strength in order to carrying/lift items to different level surfaces   Pt will demonstrate at least 4/5 scapular strength in order to improve scapulohumeral rhythm and allow patient to reach to higher level surfaces   Pt will report having an easier time donning/doffing clothing with less pain and/or restriction   Pt will perform HEP independently in order to sustain changes made with therapy sessions        Plan  Continue PT 2x/week to work on improving ROM of shoulder and elbow. Incorporate pulleys next session    Treatment Last 4 Visits  Treatment Day: 2       4/8/2025 4/11/2025   UE Treatment    Therapeutic Exercise Self soft tissue release with Lacrosse ball (posterior shoulder, rhomboids, UT, pectorals)  H/L dowel shoulder press x 15  H/L dowel shoulder flexion x 10  PROM (L) shoulder within tolerance  PROM (L) shoulder & elbow within tolerable limits  H/L dowel press x 20  H/L dowel flexion x 20  Seated shoulder flexion on table x 15  Seated scapular retraction x 10 (5 second hold)   Seated elbow extension stretch on table x 1 min  Seated shoulder ER isometric with RTB loop x 10 (5 second hold)    Manual Therapy STM/MFR (L) pectorals, rhomboids  STM/MFR (L) shoulder complex    Therapeutic Exercise Minutes 10 25   Manual Therapy Minutes 10 17   Evaluation Minutes 20    Total Time Of Timed Procedures 20 42   Total Time Of Service-Based Procedures 20 0   Total Treatment Time 40 42   HEP Access Code: [email or web address]  URL: [email or web address]  Date: 04/08/2025  Prepared by: Bhavesh Crabtree    Exercises  - Supine Shoulder Press with Dowel  - 1-2 x daily - 7 x weekly - 2 sets - 15 reps  - Supine Shoulder Flexion Extension AAROM with Dowel  - 1-2 x daily - 7 x weekly - 2 sets - 10 reps - 5 second hold  - Elbow Extension PROM  - 1-2 x daily - 7 x weekly - 2 sets - 10 reps - 5 seconds hold         HEP  Access Code: [email or web address]  URL: [email or web address]  Date: 04/08/2025  Prepared by: Bhavesh Crabtree    Exercises  - Supine Shoulder Press with Dowel  - 1-2 x daily - 7 x weekly - 2 sets - 15 reps  - Supine Shoulder Flexion Extension AAROM with Dowel  - 1-2 x daily - 7 x weekly - 2 sets - 10 reps - 5 second hold  - Elbow Extension PROM  - 1-2 x daily - 7 x weekly - 2 sets - 10 reps - 5 seconds hold    Charges  TherEx 2, Manual 1

## 2025-04-16 ENCOUNTER — OFFICE VISIT (OUTPATIENT)
Facility: LOCATION | Age: 35
End: 2025-04-16
Attending: ORTHOPAEDIC SURGERY
Payer: MEDICAID

## 2025-04-16 PROCEDURE — 97110 THERAPEUTIC EXERCISES: CPT

## 2025-04-16 PROCEDURE — 97140 MANUAL THERAPY 1/> REGIONS: CPT

## 2025-04-16 NOTE — PROGRESS NOTES
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 Working on exercises and improving ROM       Pain: 0/10     Objective  PROM (L) shoulder flexion to about 140 degrees (post treatment)       Assessment  Pt with progressively improving AA/PROM (L) shoulder in all planes. Hypertonicity noted to multiple muscle groups of the (L) shoulder complex (particularly teres major). Encouraged continued performance of HEP including improving her elbow extension    Goals (to be met in 8 visits)   Pt will report 50% reduction in symptoms rated at worst   Pt will demonstrate AROM (L) shoulder flexion to 155 degrees or greater with less pain and/or restriction in order to perform reaching activities  Pt will demonstrate AROM (L) shoulder abduction to 155 degrees or greater with less pain and/or restriction in order to perform reaching activities   Pt will demonstrate at least 4/5 (L) RC strength in order to carrying/lift items to different level surfaces   Pt will demonstrate at least 4/5 scapular strength in order to improve scapulohumeral rhythm and allow patient to reach to higher level surfaces   Pt will report having an easier time donning/doffing clothing with less pain and/or restriction   Pt will perform HEP independently in order to sustain changes made with therapy sessions          Plan  Continue PT 2x/week to work on soft tissue treatment and/or mobilization as needed to improve scapulohumeral rhythm and elbow mobility. RC and  scapular stabilization needed.    Treatment Last 4 Visits  Treatment Day: 3       4/8/2025 4/11/2025 4/16/2025   UE Treatment   Therapeutic Exercise Self soft tissue release with Lacrosse ball (posterior shoulder, rhomboids, UT, pectorals)  H/L dowel shoulder press x 15  H/L dowel shoulder flexion x 10  PROM (L) shoulder within tolerance  PROM (L) shoulder & elbow within tolerable limits  H/L dowel press x 20  H/L dowel flexion x 20  Seated shoulder flexion on table x 15  Seated scapular retraction x 10 (5 second hold)   Seated elbow extension stretch on table x 1 min  Seated shoulder ER isometric with RTB loop x 10 (5 second hold)  UBE x 4 min (L1)   H/L dowel shoulder flexion 2 x 10   OH pulley flexion 2 x 10 (5 second hold)   H/L RS with shoulder 90 deg flexion 1 min x 2  PROM (L) shoulder within tolerance & PROM (L) elbow   HEP review    Manual Therapy STM/MFR (L) pectorals, rhomboids  STM/MFR (L) shoulder complex  STM/MFR (L) biceps, teres major, rhomboids, pectorals   S/L scapular mobilization (inferior & medial glides, grade 3)   (L) elbow posterior-distraction to assist extension, grade 3   Therapeutic Exercise Minutes 10 25 15   Manual Therapy Minutes 10 17 25   Evaluation Minutes 20     Total Time Of Timed Procedures 20 42 40   Total Time Of Service-Based Procedures 20 0 0   Total Treatment Time 40 42 40              HEP  Access Code: [email or web address]  URL: [email or web address]  Date: 04/08/2025  Prepared by: Bhavesh Crabtree    Exercises  - Supine Shoulder Press with Dowel  - 1-2 x daily - 7 x weekly - 2 sets - 15 reps  - Supine Shoulder Flexion Extension AAROM with Dowel  - 1-2 x daily - 7 x weekly - 2 sets - 10 reps - 5 second hold  - Elbow Extension PROM  - 1-2 x daily - 7 x weekly - 2 sets - 10 reps - 5 seconds hold    Charges  TherEx 1, Manual 2

## 2025-04-18 ENCOUNTER — OFFICE VISIT (OUTPATIENT)
Facility: LOCATION | Age: 35
End: 2025-04-18
Attending: ORTHOPAEDIC SURGERY
Payer: MEDICAID

## 2025-04-18 PROCEDURE — 97110 THERAPEUTIC EXERCISES: CPT

## 2025-04-18 PROCEDURE — 97140 MANUAL THERAPY 1/> REGIONS: CPT

## 2025-04-18 NOTE — PROGRESS NOTES
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 Working on exercises at home. Getting more motion in the shoulder       Pain: 0/10     Objective  Approximately 145 degrees AROM (L) shoulder flexion & scaption       Assessment  Pt with improving (L) shoulder A/PROM. Scapulohumeral rhythm dysfunction noted. Poor scapular strength/stabilization. Needs cueing for proper form and positioning with exercises. Encouraged her to keep up with HEP as shoulder mobility/flexibility/ROM is improving.    Goals (to be met in 8 visits)   Pt will report 50% reduction in symptoms rated at worst   Pt will demonstrate AROM (L) shoulder flexion to 155 degrees or greater with less pain and/or restriction in order to perform reaching activities  Pt will demonstrate AROM (L) shoulder abduction to 155 degrees or greater with less pain and/or restriction in order to perform reaching activities   Pt will demonstrate at least 4/5 (L) RC strength in order to carrying/lift items to different level surfaces   Pt will demonstrate at least 4/5 scapular strength in order to improve scapulohumeral rhythm and allow patient to reach to higher level surfaces   Pt will report having an easier time donning/doffing clothing with less pain and/or restriction   Pt will perform HEP independently in order to sustain changes made with therapy sessions            Plan  Continue PT 2x/week for 4 additional visits. Work on shoulder & elbow ROM, RC & scapular  strengthening    Treatment Last 4 Visits  Treatment Day: 4       4/8/2025 4/11/2025 4/16/2025 4/18/2025   UE Treatment   Therapeutic Exercise Self soft tissue release with Lacrosse ball (posterior shoulder, rhomboids, UT, pectorals)  H/L dowel shoulder press x 15  H/L dowel shoulder flexion x 10  PROM (L) shoulder within tolerance  PROM (L) shoulder & elbow within tolerable limits  H/L dowel press x 20  H/L dowel flexion x 20  Seated shoulder flexion on table x 15  Seated scapular retraction x 10 (5 second hold)   Seated elbow extension stretch on table x 1 min  Seated shoulder ER isometric with RTB loop x 10 (5 second hold)  UBE x 4 min (L1)   H/L dowel shoulder flexion 2 x 10   OH pulley flexion 2 x 10 (5 second hold)   H/L RS with shoulder 90 deg flexion 1 min x 2  PROM (L) shoulder within tolerance & PROM (L) elbow   HEP review  UBE x 4 min (2 min forward/backward)   H/L elbow extension stretch 1# (towel above elbow) x 2 min  OH pulley x 15 (5 second hold)   Standing scapular retraction w/BTB (palms forward) 2 x 10   Standing shoulder IR GTB 2 x 10   RPB scapular retraction on wall x 15  Standing shoulder circles on wall 2# MB x 20 CW/CCW     Manual Therapy STM/MFR (L) pectorals, rhomboids  STM/MFR (L) shoulder complex  STM/MFR (L) biceps, teres major, rhomboids, pectorals   S/L scapular mobilization (inferior & medial glides, grade 3)   (L) elbow posterior-distraction to assist extension, grade 3 STM/MFR (L) shoulder complex    Therapeutic Exercise Minutes 10 25 15 28   Manual Therapy Minutes 10 17 25 14   Evaluation Minutes 20      Total Time Of Timed Procedures 20 42 40 42   Total Time Of Service-Based Procedures 20 0 0 0   Total Treatment Time 40 42 40 42               HEP  Access Code: [email or web address]  URL: [email or web address]  Date: 04/08/2025  Prepared by: Bhavesh Crabtree    Exercises  - Supine Shoulder Press with Dowel  - 1-2 x daily - 7 x weekly - 2 sets - 15 reps  - Supine Shoulder  Flexion Extension AAROM with Dowel  - 1-2 x daily - 7 x weekly - 2 sets - 10 reps - 5 second hold  - Elbow Extension PROM  - 1-2 x daily - 7 x weekly - 2 sets - 10 reps - 5 seconds hold    Charges  TherEx 2, manual 1

## 2025-04-21 ENCOUNTER — OFFICE VISIT (OUTPATIENT)
Facility: LOCATION | Age: 35
End: 2025-04-21
Attending: ORTHOPAEDIC SURGERY
Payer: MEDICAID

## 2025-04-21 PROCEDURE — 97140 MANUAL THERAPY 1/> REGIONS: CPT

## 2025-04-21 PROCEDURE — 97110 THERAPEUTIC EXERCISES: CPT

## 2025-04-21 NOTE — PROGRESS NOTES
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 Shoulder feels like its getting more mobility       Pain: 0/10     Objective  AROM (L) shoulder flexion to approximately 150 degrees (tightness but no pain), abduction to 155 degrees (tightness/no pain), HBB reach to T9 (no pain)         Assessment  Pt (L) shoulder and elbow ROM progressively improving. Pt does exhibit increased fatigue with strengthening exercises for the (L) shoulder complex. Mild scapulohumerar rhythm deficit noted. Tolerated exercises well with no reports of pain but quick to fatigue.    Goals (to be met in 8 visits)   Pt will report 50% reduction in symptoms rated at worst   Pt will demonstrate AROM (L) shoulder flexion to 155 degrees or greater with less pain and/or restriction in order to perform reaching activities  Pt will demonstrate AROM (L) shoulder abduction to 155 degrees or greater with less pain and/or restriction in order to perform reaching activities   Pt will demonstrate at least 4/5 (L) RC strength in order to carrying/lift items to different level surfaces   Pt will demonstrate at least 4/5 scapular strength in order to improve scapulohumeral rhythm and allow patient to reach to higher level surfaces   Pt will report having an easier time donning/doffing clothing with less pain and/or restriction   Pt will perform HEP independently in order to sustain changes made with therapy sessions              Plan  Continue PT  2x/week for additional 3 visits then discuss potential progress note.    Treatment Last 4 Visits  Treatment Day: 2       4/11/2025 4/16/2025 4/18/2025 4/21/2025   UE Treatment   Therapeutic Exercise PROM (L) shoulder & elbow within tolerable limits  H/L dowel press x 20  H/L dowel flexion x 20  Seated shoulder flexion on table x 15  Seated scapular retraction x 10 (5 second hold)   Seated elbow extension stretch on table x 1 min  Seated shoulder ER isometric with RTB loop x 10 (5 second hold)  UBE x 4 min (L1)   H/L dowel shoulder flexion 2 x 10   OH pulley flexion 2 x 10 (5 second hold)   H/L RS with shoulder 90 deg flexion 1 min x 2  PROM (L) shoulder within tolerance & PROM (L) elbow   HEP review  UBE x 4 min (2 min forward/backward)   H/L elbow extension stretch 1# (towel above elbow) x 2 min  OH pulley x 15 (5 second hold)   Standing scapular retraction w/BTB (palms forward) 2 x 10   Standing shoulder IR GTB 2 x 10   RPB scapular retraction on wall x 15  Standing shoulder circles on wall 2# MB x 20 CW/CCW   UBE x 4 min (forward 2, backward 2)  OH pulley flexion x 15 (5 second hold)  H/L shoulder [place] 1# x 20 CW/CCW  H/L RS at 90 degree flexion 0# x 1 min  H/L RS at 45 degree abduction/90 deg elbow flexion 2# x 1 min   H/L shoulder horizontal abduction YTB 2 x 10   Prone shoulder extension 2# 2 x 10   S/L shoulder ER 1# 2 x 10   PROM (L) shoulder & elbow      Manual Therapy STM/MFR (L) shoulder complex  STM/MFR (L) biceps, teres major, rhomboids, pectorals   S/L scapular mobilization (inferior & medial glides, grade 3)   (L) elbow posterior-distraction to assist extension, grade 3 STM/MFR (L) shoulder complex  STM/MFR (L) periscapular musculature, teres major   Therapeutic Exercise Minutes 25 15 28 30   Manual Therapy Minutes 17 25 14 12   Total Time Of Timed Procedures 42 40 42 42   Total Time Of Service-Based Procedures 0 0 0 0   Total Treatment Time 42 40 42 42        HEP  Access Code: [email or web address]  URL:  [email or web address]  Date: 04/21/2025  Prepared by: Bhavesh Crabtree    Exercises  - Supine Shoulder Flexion Extension AAROM with Dowel  - 1-2 x daily - 7 x weekly - 2 sets - 10 reps - 5 second hold  - Supine Elbow Extension Stretch in Supination  - 1-2 x daily - 7 x weekly - 3 sets - 30-60 seconds hold  - Sidelying Shoulder ER with Towel and Dumbbell  - 1-2 x daily - 7 x weekly - 2 sets - 10 reps  - Standing Shoulder Internal Rotation with Anchored Resistance  - 1-2 x daily - 7 x weekly - 2 sets - 10 reps  - Supine Shoulder Circles with Weight  - 1-2 x daily - 7 x weekly - 2 sets - 20 reps  - Wall Push Up  - 1-2 x daily - 7 x weekly - 1 sets - 10-15 reps  - Prone Shoulder Extension - Single Arm  - 1-2 x daily - 7 x weekly - 2 sets - 10 reps  - Shoulder Extension with Resistance - Palms Forward  - 1-2 x daily - 7 x weekly - 2 sets - 10 reps    Charges  TherEx 2, Manual 1

## 2025-04-22 ENCOUNTER — TELEPHONE (OUTPATIENT)
Dept: ORTHOPEDICS CLINIC | Facility: CLINIC | Age: 35
End: 2025-04-22

## 2025-04-22 NOTE — TELEPHONE ENCOUNTER
Patient has a follow up scheduled for 4/24 and would like to know if a note is able to be written for her to be cleared to go back to work, uploaded to her [employer] and to cancel her follow up appointment. Please advise   [phone] (home)

## 2025-04-23 ENCOUNTER — OFFICE VISIT (OUTPATIENT)
Facility: LOCATION | Age: 35
End: 2025-04-23
Attending: ORTHOPAEDIC SURGERY
Payer: MEDICAID

## 2025-04-23 PROCEDURE — 97140 MANUAL THERAPY 1/> REGIONS: CPT

## 2025-04-23 PROCEDURE — 97110 THERAPEUTIC EXERCISES: CPT

## 2025-04-23 NOTE — TELEPHONE ENCOUNTER
Returned call and advised patient she needs to keep her follow up appointment tomorrow so Dr. Olivas can re-check her shoulder, and then note will be given.   Patient verbalized understanding.     LOV 3/27/25  Although she once again presents without her shoulder immobilizer, she states that she still wears it at night but not at work or during the day. 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 I strongly urged her to avoid this if at all possible and we provided her with a work restriction note limiting the left upper extremity to 10 pounds. The MRI findings do not identify a clear surgical lesion such as a bony or ligamentous Bank[place]. She relates never having had a previous treatment including physical therapy. She demonstrates elbow flexion contracture presumably related to immobilization which we should address with physical therapy to reverse her elbow contracture.   Reasonable to recheck her after the conclusion of physical therapy perhaps in 6 to 8 weeks. Questions were answered and she verbalized understanding

## 2025-04-23 NOTE — PROGRESS NOTES
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       Pain: 0/10     Objective  Pt with mild scapulohumeral rhythm dysfunction.       Assessment  Pt with improving A/PROM (L) shoulder in all planes however still limitations noted. Scapular & RC strength deficits noted. Quick to fatigue with exercises. Tolerating treatment well with no reports of pain    Goals (to be met in 8 visits)   Pt will report 50% reduction in symptoms rated at worst   Pt will demonstrate AROM (L) shoulder flexion to 155 degrees or greater with less pain and/or restriction in order to perform reaching activities  Pt will demonstrate AROM (L) shoulder abduction to 155 degrees or greater with less pain and/or restriction in order to perform reaching activities   Pt will demonstrate at least 4/5 (L) RC strength in order to carrying/lift items to different level surfaces   Pt will demonstrate at least 4/5 scapular strength in order to improve scapulohumeral rhythm and allow patient to reach to higher level surfaces   Pt will report having an easier time donning/doffing clothing with less pain and/or restriction   Pt will perform HEP independently in order to sustain changes made with therapy sessions                Plan  Continue PT for 2 more visits. Potential progress note. Continue working on ROM, RC & scapular strengthening    Treatment Last 4 Visits  Treatment Day: 6       4/16/2025 4/18/2025 4/21/2025  4/23/2025   UE Treatment   Therapeutic Exercise UBE x 4 min (L1)   H/L dowel shoulder flexion 2 x 10   OH pulley flexion 2 x 10 (5 second hold)   H/L RS with shoulder 90 deg flexion 1 min x 2  PROM (L) shoulder within tolerance & PROM (L) elbow   HEP review  UBE x 4 min (2 min forward/backward)   H/L elbow extension stretch 1# (towel above elbow) x 2 min  OH pulley x 15 (5 second hold)   Standing scapular retraction w/BTB (palms forward) 2 x 10   Standing shoulder IR GTB 2 x 10   RPB scapular retraction on wall x 15  Standing shoulder circles on wall 2# MB x 20 CW/CCW   UBE x 4 min (forward 2, backward 2)  OH pulley flexion x 15 (5 second hold)  H/L shoulder [place] 1# x 20 CW/CCW  H/L RS at 90 degree flexion 0# x 1 min  H/L RS at 45 degree abduction/90 deg elbow flexion 2# x 1 min   H/L shoulder horizontal abduction YTB 2 x 10   Prone shoulder extension 2# 2 x 10   S/L shoulder ER 1# 2 x 10   PROM (L) shoulder & elbow    UBE x 6 min   OH pulley flexion x 20 (5 second hold)   Doorway upper back/rhomboid stretch 30 sec x 2  Prone scapular retraction x 10 (5 second hold)   S/L ER 1# 2 x 10   Standing shoulder IR double GTB 2 x 10   Standing scapular retraction w/shoulder extension (palms out) blue theratube 2 x 10 (5 second hold)   PT assisted elbow extension stretch x 1 min   Standing SA activation FR up wall 2 x 10    Manual Therapy STM/MFR (L) biceps, teres major, rhomboids, pectorals   S/L scapular mobilization (inferior & medial glides, grade 3)   (L) elbow posterior-distraction to assist extension, grade 3 STM/MFR (L) shoulder complex  STM/MFR (L) periscapular musculature, teres major STM/MFR (L) infraspinatus, rhomboids, teres major, lat, pectoral musculature    Therapeutic Exercise Minutes 15 28 30 28   Manual Therapy Minutes 25 14 12 15   Total Time Of Timed Procedures 40 42 42 43   Total Time Of Service-Based Procedures 0 0 0 0   Total Treatment Time 40 42 42 43        HEP  Access Code: [email or web address]  URL:  [email or web address]  Date: 04/08/2025  Prepared by: Bhavesh Crabtree    Exercises  - Supine Shoulder Press with Dowel  - 1-2 x daily - 7 x weekly - 2 sets - 15 reps  - Supine Shoulder Flexion Extension AAROM with Dowel  - 1-2 x daily - 7 x weekly - 2 sets - 10 reps - 5 second hold  - Elbow Extension PROM  - 1-2 x daily - 7 x weekly - 2 sets - 10 reps - 5 seconds hold    Charges  TherEx 2, Manual 1

## 2025-04-30 ENCOUNTER — APPOINTMENT (OUTPATIENT)
Facility: LOCATION | Age: 35
End: 2025-04-30
Attending: ORTHOPAEDIC SURGERY
Payer: MEDICAID

## 2025-05-02 ENCOUNTER — APPOINTMENT (OUTPATIENT)
Facility: LOCATION | Age: 35
End: 2025-05-02
Attending: ORTHOPAEDIC SURGERY
Payer: MEDICAID

## 2025-07-11 NOTE — TELEPHONE ENCOUNTER
July 11, 2025      [facility]  [address]  [place] [address]  Phone: [phone]  Fax: [phone]       Patient: Konrad Harper   YOB: 2005  Date of Visit: 07/11/2025    To Whom It May Concern:    Matthew Harper  was at [facility] on 07/11/2025. The patient may return to work/school on 07/13/2025 as long as he is fever free and symptoms improve with no restrictions. If you have any questions or concerns, or if I can be of further assistance, please do not hesitate to contact me.    Sincerely,     Elo Andersen PA-C      Spoke to pt, scheduled for   Future Appointments   Date Time Provider Department Center   3/27/2025  1:00 PM [id number]            As offered by Dr. Olivas.

## (undated) NOTE — LETTER
Date & Time: 8/24/2018, 2:30 PM  Patient: Ana Villasenor  Encounter Provider(s):    MD Shawna Anders APN       To Whom It May Concern:    Amadeo Motta was seen and treated in our department on 8/24/2018.  She may retu

## (undated) NOTE — LETTER
January 23, 2025    Patient: Мария Argueta   Date of Visit: 1/23/2025       To Whom It May Concern:    Мария Argueta was seen and treated in our emergency department on 1/23/2025.     If you have any questions or concerns, please don't hesitate to call.       Encounter Provider(s):    Clary Marquez, DO

## (undated) NOTE — LETTER
Date: 3/27/2025    Patient Name: Мария Argueta          To Whom it may concern:    The above patient was seen at [facility] for treatment of a medical condition.      The patient may work with the following restrictions:  10 pound weight restriction on the left upper extremity.    These restrictions will apply until 04/25/2025.          If you have any questions please contact our office at [phone].      Sincerely,    Mindy Olivas MD

## (undated) NOTE — LETTER
Date & Time: 8/3/2024, 12:17 AM  Patient: Мария Argueta  Encounter Provider(s):    Bryn Rodríguez MD       To Whom It May Concern:    Мария Argueta was seen and treated in our department on 8/2/2024. She can return to work on 8/5/2024.    If you have any questions or concerns, please do not hesitate to call.        _____________________________  Physician/APC Signature

## (undated) NOTE — ED AVS SNAPSHOT
Мария Dupree   MRN: [id number]    Department:  [facility] Emergency Department   Date of Visit:  1/16/2019           Disclosure     Insurance plans vary and the physician(s) referred by the ER may not be covered by your plan.  Please co tell this physician (or your personal doctor if your instructions are to return to your personal doctor) about any new or lasting problems. The primary care or specialist physician will see patients referred from the [facility] Emergency Department.  Radha Rhodes

## (undated) NOTE — ED AVS SNAPSHOT
Мария Ahuja Iron   MRN: [id number]    Department:  [facility] Emergency Department   Date of Visit:  8/24/2018           Disclosure     Insurance plans vary and the physician(s) referred by the ER may not be covered by your plan.  Please co tell this physician (or your personal doctor if your instructions are to return to your personal doctor) about any new or lasting problems. The primary care or specialist physician will see patients referred from the [facility] Emergency Department.  Karli Santiago

## (undated) NOTE — LETTER
Date: 3/27/2025    Patient Name: Мария Argueta          To Whom it may concern:    This letter has been written at the patient's request.at [facility] for treatment of a medical condition.      The patient may work with the following restrictions:  10 pound weight restriction on the left upper extremity.    These restrictions will apply until 04/25/2025.          If you have any questions please contact our office at [phone].        Sincerely,    Mindy Olivas MD